# Patient Record
Sex: FEMALE | NOT HISPANIC OR LATINO | Employment: FULL TIME | URBAN - METROPOLITAN AREA
[De-identification: names, ages, dates, MRNs, and addresses within clinical notes are randomized per-mention and may not be internally consistent; named-entity substitution may affect disease eponyms.]

---

## 2017-10-24 ENCOUNTER — COMMUNICATION - HEALTHEAST (OUTPATIENT)
Dept: FAMILY MEDICINE | Facility: CLINIC | Age: 32
End: 2017-10-24

## 2019-07-18 ENCOUNTER — RECORDS - HEALTHEAST (OUTPATIENT)
Dept: ADMINISTRATIVE | Facility: OTHER | Age: 34
End: 2019-07-18

## 2019-07-18 ENCOUNTER — HOSPITAL ENCOUNTER (INPATIENT)
Facility: CLINIC | Age: 34
LOS: 5 days | Discharge: HOME | DRG: 137 | End: 2019-07-23
Attending: EMERGENCY MEDICINE | Admitting: INTERNAL MEDICINE
Payer: COMMERCIAL

## 2019-07-18 DIAGNOSIS — K04.7 DENTAL ABSCESS: ICD-10-CM

## 2019-07-18 PROCEDURE — 25000128 H RX IP 250 OP 636: Performed by: EMERGENCY MEDICINE

## 2019-07-18 PROCEDURE — 25000132 ZZH RX MED GY IP 250 OP 250 PS 637: Performed by: PHYSICIAN ASSISTANT

## 2019-07-18 PROCEDURE — 41800 DRAINAGE OF GUM LESION: CPT | Performed by: EMERGENCY MEDICINE

## 2019-07-18 PROCEDURE — 99156 MOD SED OTH PHYS/QHP 5/>YRS: CPT | Mod: Z6 | Performed by: EMERGENCY MEDICINE

## 2019-07-18 PROCEDURE — 25000128 H RX IP 250 OP 636: Performed by: HOSPITALIST

## 2019-07-18 PROCEDURE — 99157 MOD SED OTHER PHYS/QHP EA: CPT | Mod: Z6 | Performed by: EMERGENCY MEDICINE

## 2019-07-18 PROCEDURE — 99285 EMERGENCY DEPT VISIT HI MDM: CPT | Mod: 25 | Performed by: EMERGENCY MEDICINE

## 2019-07-18 PROCEDURE — 87040 BLOOD CULTURE FOR BACTERIA: CPT | Performed by: HOSPITALIST

## 2019-07-18 PROCEDURE — G0378 HOSPITAL OBSERVATION PER HR: HCPCS

## 2019-07-18 PROCEDURE — 99156 MOD SED OTH PHYS/QHP 5/>YRS: CPT | Performed by: EMERGENCY MEDICINE

## 2019-07-18 PROCEDURE — 96365 THER/PROPH/DIAG IV INF INIT: CPT | Performed by: EMERGENCY MEDICINE

## 2019-07-18 PROCEDURE — 96376 TX/PRO/DX INJ SAME DRUG ADON: CPT | Performed by: EMERGENCY MEDICINE

## 2019-07-18 PROCEDURE — 96375 TX/PRO/DX INJ NEW DRUG ADDON: CPT | Performed by: EMERGENCY MEDICINE

## 2019-07-18 PROCEDURE — 99157 MOD SED OTHER PHYS/QHP EA: CPT | Performed by: EMERGENCY MEDICINE

## 2019-07-18 PROCEDURE — 99220 ZZC INITIAL OBSERVATION CARE,LEVL III: CPT | Performed by: PHYSICIAN ASSISTANT

## 2019-07-18 PROCEDURE — 25800030 ZZH RX IP 258 OP 636: Performed by: PHYSICIAN ASSISTANT

## 2019-07-18 PROCEDURE — 25800030 ZZH RX IP 258 OP 636

## 2019-07-18 PROCEDURE — 0C940ZZ DRAINAGE OF BUCCAL MUCOSA, OPEN APPROACH: ICD-10-PCS | Performed by: EMERGENCY MEDICINE

## 2019-07-18 PROCEDURE — 12000001 ZZH R&B MED SURG/OB UMMC

## 2019-07-18 PROCEDURE — 25000128 H RX IP 250 OP 636

## 2019-07-18 PROCEDURE — 25000128 H RX IP 250 OP 636: Performed by: PHYSICIAN ASSISTANT

## 2019-07-18 PROCEDURE — 36415 COLL VENOUS BLD VENIPUNCTURE: CPT | Performed by: HOSPITALIST

## 2019-07-18 PROCEDURE — 25000132 ZZH RX MED GY IP 250 OP 250 PS 637: Performed by: HOSPITALIST

## 2019-07-18 RX ORDER — PROPOFOL 10 MG/ML
INJECTION, EMULSION INTRAVENOUS
Status: COMPLETED
Start: 2019-07-18 | End: 2019-07-18

## 2019-07-18 RX ORDER — LIDOCAINE 40 MG/G
CREAM TOPICAL
Status: DISCONTINUED | OUTPATIENT
Start: 2019-07-18 | End: 2019-07-23 | Stop reason: HOSPADM

## 2019-07-18 RX ORDER — LIDOCAINE 40 MG/G
CREAM TOPICAL
Status: DISCONTINUED | OUTPATIENT
Start: 2019-07-18 | End: 2019-07-18

## 2019-07-18 RX ORDER — ONDANSETRON 4 MG/1
4 TABLET, ORALLY DISINTEGRATING ORAL EVERY 6 HOURS PRN
Status: DISCONTINUED | OUTPATIENT
Start: 2019-07-18 | End: 2019-07-18

## 2019-07-18 RX ORDER — ACETAMINOPHEN 325 MG/1
975 TABLET ORAL EVERY 8 HOURS PRN
Status: DISCONTINUED | OUTPATIENT
Start: 2019-07-18 | End: 2019-07-18

## 2019-07-18 RX ORDER — AMPICILLIN AND SULBACTAM 2; 1 G/1; G/1
3 INJECTION, POWDER, FOR SOLUTION INTRAMUSCULAR; INTRAVENOUS EVERY 6 HOURS
Status: DISCONTINUED | OUTPATIENT
Start: 2019-07-18 | End: 2019-07-22

## 2019-07-18 RX ORDER — NALOXONE HYDROCHLORIDE 0.4 MG/ML
.1-.4 INJECTION, SOLUTION INTRAMUSCULAR; INTRAVENOUS; SUBCUTANEOUS
Status: DISCONTINUED | OUTPATIENT
Start: 2019-07-18 | End: 2019-07-18

## 2019-07-18 RX ORDER — OXYCODONE HYDROCHLORIDE 5 MG/1
5 TABLET ORAL EVERY 4 HOURS PRN
Status: DISCONTINUED | OUTPATIENT
Start: 2019-07-18 | End: 2019-07-19

## 2019-07-18 RX ORDER — AMPICILLIN AND SULBACTAM 2; 1 G/1; G/1
3 INJECTION, POWDER, FOR SOLUTION INTRAMUSCULAR; INTRAVENOUS ONCE
Status: COMPLETED | OUTPATIENT
Start: 2019-07-18 | End: 2019-07-18

## 2019-07-18 RX ORDER — AMPICILLIN AND SULBACTAM 2; 1 G/1; G/1
3 INJECTION, POWDER, FOR SOLUTION INTRAMUSCULAR; INTRAVENOUS EVERY 6 HOURS
Status: DISCONTINUED | OUTPATIENT
Start: 2019-07-18 | End: 2019-07-18

## 2019-07-18 RX ORDER — SODIUM CHLORIDE 9 MG/ML
INJECTION, SOLUTION INTRAVENOUS
Status: COMPLETED
Start: 2019-07-18 | End: 2019-07-18

## 2019-07-18 RX ORDER — ACETAMINOPHEN 325 MG/1
975 TABLET ORAL 3 TIMES DAILY
Status: DISCONTINUED | OUTPATIENT
Start: 2019-07-18 | End: 2019-07-23 | Stop reason: HOSPADM

## 2019-07-18 RX ORDER — ONDANSETRON 2 MG/ML
4 INJECTION INTRAMUSCULAR; INTRAVENOUS EVERY 6 HOURS PRN
Status: DISCONTINUED | OUTPATIENT
Start: 2019-07-18 | End: 2019-07-18

## 2019-07-18 RX ORDER — HYDROMORPHONE HYDROCHLORIDE 1 MG/ML
.3-.5 INJECTION, SOLUTION INTRAMUSCULAR; INTRAVENOUS; SUBCUTANEOUS
Status: DISCONTINUED | OUTPATIENT
Start: 2019-07-18 | End: 2019-07-18

## 2019-07-18 RX ORDER — POLYETHYLENE GLYCOL 3350 17 G/17G
17 POWDER, FOR SOLUTION ORAL DAILY PRN
Status: DISCONTINUED | OUTPATIENT
Start: 2019-07-18 | End: 2019-07-23 | Stop reason: HOSPADM

## 2019-07-18 RX ORDER — BISACODYL 10 MG
10 SUPPOSITORY, RECTAL RECTAL DAILY PRN
Status: DISCONTINUED | OUTPATIENT
Start: 2019-07-18 | End: 2019-07-23 | Stop reason: HOSPADM

## 2019-07-18 RX ORDER — KETOROLAC TROMETHAMINE 30 MG/ML
30 INJECTION, SOLUTION INTRAMUSCULAR; INTRAVENOUS ONCE
Status: COMPLETED | OUTPATIENT
Start: 2019-07-18 | End: 2019-07-18

## 2019-07-18 RX ORDER — ONDANSETRON 4 MG/1
4 TABLET, ORALLY DISINTEGRATING ORAL EVERY 6 HOURS PRN
Status: DISCONTINUED | OUTPATIENT
Start: 2019-07-18 | End: 2019-07-23 | Stop reason: HOSPADM

## 2019-07-18 RX ORDER — FENTANYL CITRATE 50 UG/ML
INJECTION, SOLUTION INTRAMUSCULAR; INTRAVENOUS
Status: COMPLETED
Start: 2019-07-18 | End: 2019-07-18

## 2019-07-18 RX ORDER — SACCHAROMYCES BOULARDII 250 MG
250 CAPSULE ORAL 2 TIMES DAILY
Status: DISCONTINUED | OUTPATIENT
Start: 2019-07-18 | End: 2019-07-23 | Stop reason: HOSPADM

## 2019-07-18 RX ORDER — HYDROMORPHONE HYDROCHLORIDE 1 MG/ML
0.5 INJECTION, SOLUTION INTRAMUSCULAR; INTRAVENOUS; SUBCUTANEOUS ONCE
Status: COMPLETED | OUTPATIENT
Start: 2019-07-18 | End: 2019-07-18

## 2019-07-18 RX ORDER — HYDROMORPHONE HCL/0.9% NACL/PF 0.2MG/0.2
0.2 SYRINGE (ML) INTRAVENOUS
Status: DISCONTINUED | OUTPATIENT
Start: 2019-07-18 | End: 2019-07-22

## 2019-07-18 RX ORDER — NALOXONE HYDROCHLORIDE 0.4 MG/ML
.1-.4 INJECTION, SOLUTION INTRAMUSCULAR; INTRAVENOUS; SUBCUTANEOUS
Status: DISCONTINUED | OUTPATIENT
Start: 2019-07-18 | End: 2019-07-23 | Stop reason: HOSPADM

## 2019-07-18 RX ORDER — AMOXICILLIN 250 MG
1 CAPSULE ORAL 2 TIMES DAILY
Status: DISCONTINUED | OUTPATIENT
Start: 2019-07-18 | End: 2019-07-23 | Stop reason: HOSPADM

## 2019-07-18 RX ORDER — OXYCODONE HYDROCHLORIDE 5 MG/1
5-10 TABLET ORAL
Status: DISCONTINUED | OUTPATIENT
Start: 2019-07-18 | End: 2019-07-18

## 2019-07-18 RX ORDER — SODIUM CHLORIDE 9 MG/ML
INJECTION, SOLUTION INTRAVENOUS CONTINUOUS
Status: DISCONTINUED | OUTPATIENT
Start: 2019-07-18 | End: 2019-07-20

## 2019-07-18 RX ORDER — LORAZEPAM 2 MG/ML
INJECTION INTRAMUSCULAR
Status: COMPLETED
Start: 2019-07-18 | End: 2019-07-18

## 2019-07-18 RX ORDER — AMOXICILLIN 250 MG
2 CAPSULE ORAL 2 TIMES DAILY
Status: DISCONTINUED | OUTPATIENT
Start: 2019-07-18 | End: 2019-07-23 | Stop reason: HOSPADM

## 2019-07-18 RX ORDER — IBUPROFEN 600 MG/1
600 TABLET, FILM COATED ORAL EVERY 6 HOURS
Status: DISCONTINUED | OUTPATIENT
Start: 2019-07-18 | End: 2019-07-18

## 2019-07-18 RX ORDER — HYDROMORPHONE HYDROCHLORIDE 1 MG/ML
0.5 INJECTION, SOLUTION INTRAMUSCULAR; INTRAVENOUS; SUBCUTANEOUS
Status: DISCONTINUED | OUTPATIENT
Start: 2019-07-18 | End: 2019-07-18

## 2019-07-18 RX ORDER — KETOROLAC TROMETHAMINE 30 MG/ML
30 INJECTION, SOLUTION INTRAMUSCULAR; INTRAVENOUS EVERY 6 HOURS PRN
Status: DISCONTINUED | OUTPATIENT
Start: 2019-07-18 | End: 2019-07-19

## 2019-07-18 RX ORDER — HYDROMORPHONE HCL/0.9% NACL/PF 0.2MG/0.2
0.2 SYRINGE (ML) INTRAVENOUS
Status: DISCONTINUED | OUTPATIENT
Start: 2019-07-18 | End: 2019-07-18

## 2019-07-18 RX ORDER — ACETAMINOPHEN 325 MG/1
650 TABLET ORAL EVERY 4 HOURS
Status: DISCONTINUED | OUTPATIENT
Start: 2019-07-18 | End: 2019-07-18

## 2019-07-18 RX ORDER — ONDANSETRON 2 MG/ML
4 INJECTION INTRAMUSCULAR; INTRAVENOUS EVERY 6 HOURS PRN
Status: DISCONTINUED | OUTPATIENT
Start: 2019-07-18 | End: 2019-07-23 | Stop reason: HOSPADM

## 2019-07-18 RX ORDER — CHLORHEXIDINE GLUCONATE ORAL RINSE 1.2 MG/ML
15 SOLUTION DENTAL 2 TIMES DAILY
Status: DISCONTINUED | OUTPATIENT
Start: 2019-07-18 | End: 2019-07-23 | Stop reason: HOSPADM

## 2019-07-18 RX ADMIN — HYDROMORPHONE HYDROCHLORIDE 0.5 MG: 1 INJECTION, SOLUTION INTRAMUSCULAR; INTRAVENOUS; SUBCUTANEOUS at 08:38

## 2019-07-18 RX ADMIN — CHLORHEXIDINE GLUCONATE 0.12% ORAL RINSE 15 ML: 1.2 LIQUID ORAL at 20:21

## 2019-07-18 RX ADMIN — Medication 0.2 MG: at 20:22

## 2019-07-18 RX ADMIN — LORAZEPAM 1 MG: 2 INJECTION INTRAMUSCULAR; INTRAVENOUS at 05:10

## 2019-07-18 RX ADMIN — HYDROMORPHONE HYDROCHLORIDE 0.5 MG: 1 INJECTION, SOLUTION INTRAMUSCULAR; INTRAVENOUS; SUBCUTANEOUS at 02:46

## 2019-07-18 RX ADMIN — AMPICILLIN SODIUM AND SULBACTAM SODIUM 3 G: 2; 1 INJECTION, POWDER, FOR SOLUTION INTRAMUSCULAR; INTRAVENOUS at 08:38

## 2019-07-18 RX ADMIN — PROPOFOL: 10 INJECTION, EMULSION INTRAVENOUS at 04:36

## 2019-07-18 RX ADMIN — OXYCODONE HYDROCHLORIDE 5 MG: 5 TABLET ORAL at 22:33

## 2019-07-18 RX ADMIN — Medication 250 MG: at 10:58

## 2019-07-18 RX ADMIN — ACETAMINOPHEN 975 MG: 325 TABLET, FILM COATED ORAL at 23:59

## 2019-07-18 RX ADMIN — KETOROLAC TROMETHAMINE 30 MG: 30 INJECTION, SOLUTION INTRAMUSCULAR at 05:25

## 2019-07-18 RX ADMIN — AMPICILLIN SODIUM AND SULBACTAM SODIUM 3 G: 2; 1 INJECTION, POWDER, FOR SOLUTION INTRAMUSCULAR; INTRAVENOUS at 22:57

## 2019-07-18 RX ADMIN — OXYCODONE HYDROCHLORIDE 5 MG: 5 TABLET ORAL at 10:58

## 2019-07-18 RX ADMIN — HYDROMORPHONE HYDROCHLORIDE 0.5 MG: 1 INJECTION, SOLUTION INTRAMUSCULAR; INTRAVENOUS; SUBCUTANEOUS at 03:28

## 2019-07-18 RX ADMIN — KETOROLAC TROMETHAMINE 30 MG: 30 INJECTION, SOLUTION INTRAMUSCULAR at 10:05

## 2019-07-18 RX ADMIN — Medication 0.2 MG: at 23:59

## 2019-07-18 RX ADMIN — HYDROMORPHONE HYDROCHLORIDE 1 MG: 1 INJECTION, SOLUTION INTRAMUSCULAR; INTRAVENOUS; SUBCUTANEOUS at 05:22

## 2019-07-18 RX ADMIN — FENTANYL CITRATE 100 MCG: 50 INJECTION INTRAMUSCULAR; INTRAVENOUS at 05:04

## 2019-07-18 RX ADMIN — Medication 250 MG: at 20:40

## 2019-07-18 RX ADMIN — SODIUM CHLORIDE 1000 ML: 9 INJECTION, SOLUTION INTRAVENOUS at 04:35

## 2019-07-18 RX ADMIN — ACETAMINOPHEN 975 MG: 325 TABLET, FILM COATED ORAL at 15:59

## 2019-07-18 RX ADMIN — KETOROLAC TROMETHAMINE 30 MG: 30 INJECTION, SOLUTION INTRAMUSCULAR at 15:59

## 2019-07-18 RX ADMIN — ACETAMINOPHEN 650 MG: 325 TABLET, FILM COATED ORAL at 10:05

## 2019-07-18 RX ADMIN — AMPICILLIN SODIUM AND SULBACTAM SODIUM 3 G: 2; 1 INJECTION, POWDER, FOR SOLUTION INTRAMUSCULAR; INTRAVENOUS at 15:59

## 2019-07-18 RX ADMIN — Medication 0.2 MG: at 17:14

## 2019-07-18 RX ADMIN — OXYCODONE HYDROCHLORIDE 5 MG: 5 TABLET ORAL at 18:43

## 2019-07-18 RX ADMIN — OXYCODONE HYDROCHLORIDE 5 MG: 5 TABLET ORAL at 14:53

## 2019-07-18 RX ADMIN — HYDROMORPHONE HYDROCHLORIDE 0.5 MG: 1 INJECTION, SOLUTION INTRAMUSCULAR; INTRAVENOUS; SUBCUTANEOUS at 10:11

## 2019-07-18 RX ADMIN — KETOROLAC TROMETHAMINE 30 MG: 30 INJECTION, SOLUTION INTRAMUSCULAR at 22:52

## 2019-07-18 RX ADMIN — SODIUM CHLORIDE: 9 INJECTION, SOLUTION INTRAVENOUS at 10:12

## 2019-07-18 RX ADMIN — SENNOSIDES AND DOCUSATE SODIUM 2 TABLET: 8.6; 5 TABLET ORAL at 20:22

## 2019-07-18 ASSESSMENT — ENCOUNTER SYMPTOMS
FEVER: 1
ABDOMINAL PAIN: 0
SHORTNESS OF BREATH: 0

## 2019-07-18 ASSESSMENT — MIFFLIN-ST. JEOR: SCORE: 1489.21

## 2019-07-18 ASSESSMENT — PAIN DESCRIPTION - DESCRIPTORS: DESCRIPTORS: ACHING;DISCOMFORT

## 2019-07-18 NOTE — PLAN OF CARE
Focus: Admit from ED  D: Arrived alert and orientated times four. Complaining of lots of pain. Penrose drain intact inside of left cheek. P: Continue to monitor.

## 2019-07-18 NOTE — PLAN OF CARE
Observation goals:  1.Pain control:  Pt able to sleep a little at beginning of shift but woke up with increase mouth pain from tooth abscess.  Drain in place. Cheek swollen. Medicated with scheduled tylenol,Toradol with some relief but pain still intolerable. Medicated with IV dilauidid with some relief. Oxycodone given when due. Ice pack to cheek.  2. Tolerating orals:  Patient tolerating pudding and a few soft foods but mouth/tooth abscess became painful. Medicated with oxycodone. Ice pack to cheek.  3: Ambulation:  Pt able to ambulate to bathroom with stand by assist of 1. Gait slow but steady. Voiding without problems. Pt states she can not walk further because mouth/jaw painful and she needs to rest.   4: Vital signs:Temp 99.9 Tylenol po given.Call light within reach.

## 2019-07-18 NOTE — PROGRESS NOTES
Dental Service Progress Note    Interval History: Patient was seen in the ED this morning for a vestibular incision and drainage of the left mandible. Visited the patient at 9:30 am to evaluate how she was feeling (4.5 hours post-op).       Exam:     General: Patient is up, moving, and speaking on the phone when I visited. Patient states that she is feeling a lot better since the procedure and that she apologizes for how rude she was last night. Patient's speaking is much clearer than pre-op.     Extraoral exam: Patient's swelling as increased slightly since the procedure but the area is significantly less tender to palpation and less indurated.      Intraoral exam: Patient still has limited opening but she states it is less painful. Negative for raised tongue or FOM swelling. Drain is in place, patient claims she is being careful not to disturb it.        Assessment: Patient's general habitus indicates she is recovering well from the procedure but continued monitoring is prudent.      Plan: Patient stated she is staying as an inpatient until at least tomorrow for monitoring and antibiotic therapy. Patient will be evaluated again tomorrow. Patient was provided with follow up information for the [facility] dental clinic and was informed she should not delay coming in for an appointment to remove the source of her infection.         Timoteo Dotson DDS   PGY-1  Pager: [phone]

## 2019-07-18 NOTE — CONSULTS
Dental Service Consultation        Zahida Garcia MRN# [id number]   YOB: 1985 Age: 34 year old   Date of Admission: 7/18/2019     Reason for consult: I was asked by Dr. Marcella Schultz to evaluate this patient for left mandibular swelling.           Assessment and Plan:   Assessment: Patient presents with indurated swelling in the area of the left mandible that is tender to palpation and extends anterior to the area of the left parasymphasis and posterior to the body of the mandible. Unable to palpate the left inferior border of the mandible Patient is seated in her bed and appears anxious and very uncomfortable. Patient denies difficulty breathing but reports difficulty/pain while swallowing and states there are changes in her voice (sounds deeper than normal). Patient has a limited two finger mouth opening. CT imaging provided by [facility] shows possible pterygomandibular, masseteric, and buccal space infection.      Plan: Consultation with OMFS and incision and drainage of abscess under light conscious sedation with local anesthetic.              Chief Complaint:   Patient states that her mouth has swollen rapidly since yesterday. She is taking pain medication and antibiotics for the swelling but that nothing helps at this time. Patient states she is in misery and wants to do anything to help alleviate her discomfort.    History is obtained from the patient         History of Present Illness:   This patient is a 34 year old female who presents to who presents from [facility] ED for evaluation of a left mandibular odontogenic abscess. Patient states a root canal had been completed in that area about a month ago. Patient noticed her mandible was tender on Saturday and treated the area conservatively with antibiotics Patient went to an oral surgeon on Monday and had the abscess drained. Patient claims that yesterday the lesion started to swell much more rapidly and she started to feel  significantly worse. Patient was ultimately sent to the ER where a CT was performed. Patient was then transferred to [facility] ED.              Past Medical History:   History reviewed. No pertinent past medical history.          Past Surgical History:     Past Surgical History:   Procedure Laterality Date     APPENDECTOMY       CHOLECYSTECTOMY                 Social History:     Social History     Tobacco Use     Smoking status: Current Every Day Smoker     Packs/day: 0.25     Smokeless tobacco: Never Used   Substance Use Topics     Alcohol use: Yes     Comment: rarely             Family History:   History reviewed. No pertinent family history.          Immunizations:     There is no immunization history on file for this patient.          Allergies:     Allergies   Allergen Reactions     Tramadol Other (See Comments)     Seizures per patient             Medications:     No current Epic-ordered facility-administered medications on file.      Current Outpatient Medications Ordered in Epic   Medication     HYDROcodone-acetaminophen (NORCO) 5-325 MG per tablet     ondansetron (ZOFRAN ODT) 4 MG disintegrating tablet             Review of Systems:   The 4 point Review of Systems is negative other than noted in the HPI            Physical Exam:   Vitals were reviewed  Temp: 98.4  F (36.9  C) Temp src: Oral BP: 114/81 Pulse: 67   Resp: 16 SpO2: 100 % O2 Device: None (Room air)      Head and neck exam:  Patient presents with indurated swelling in the area of the left mandible that is tender to palpation and extends anterior to the area of the left parasymphasis and posterior to the body of the mandible. Unable to palpate the left inferior border of the mandible. Patient recoils when palpating the area in pain. Area of swelling marked with surgical site marker.    Intraoral exam: Limited due to patients limited opening. Patient's tongue is not raised from the FOM.        Data:   Radiographic interpretation: CT image  taken on 7/18/19  Patient has fluid infiltration of the pterygomandibular, masseteric, and buccal spaces.      The patient was discussed with: Dr. Schultz and Dr. Howard Dotson DDS  PGY1  Pager: [phone]

## 2019-07-18 NOTE — H&P
[facility]    History and Physical - Hospitalist Service       Date of Admission:  7/18/2019    Assessment & Plan   Zahida Garcia is a 34 year old female admitted on 7/18/2019 for dental abscess.     #Dental abscess: In setting of recent root canal, worsening pain and swelling x4 days, failed OP antibiotics. Presented to OSH ED, CT w/ moderate soft tissue swelling and inflammatory changes involving the apical aspect of the left hemimandible w/ periapical lucency involving tooth #19, immediately adjacent to that tooth, there is 2.5 x 13 mm fluid collection along the buccal soft tissues. S/P stab incision and debridement w/ purulent drainage, drain placed by OMFS. WBC count & LA normal 07/17 at OSH. VS w/ normotension, normal HR, afebrile.   -Dental and OMFS following  -IV antibiotics w/ Unasyn Q6 while on obs, can transition to oral Augmentin on discharge (875 mg BID x7-14 days)  -NS at 125 ml/hour while on obs  -Pain management w/ scheduled tylenol, PRN toradol, PRN oxycodone, PRN dilaudid for severe pain  -Bowel regimen ordered  -Anti emetics available  -Oral cares per Dental, per OMFS, needs to follow up with dental clinic in 2-3 days for consideration of pulling drain and continued f/u     Diet: ADAT to mechanical soft  DVT Prophylaxis: Pneumatic Compression Devices and Ambulate every shift  Adamsno Catheter: not present  Code Status: FULL    Disposition Plan   Expected discharge: Tomorrow, recommended to prior living arrangement once pain well controlled, plan for antibiotics.  Entered: Fadia Frost PA-C 07/18/2019, 8:10 AM     The patient's care was discussed with the Attending Physician, Dr. Ballesteros, Bedside Nurse, OMFS, dental and Patient.    Fadia Frost PA-C  [facility]    ______________________________________________________________________    Chief Complaint   Dental pain    History is obtained from the  patient    History of Present Illness   Zahida Garcia is a 34 year old female admitted on 7/18/2019 for dental abscess.     The patient notes she had a root canal 1 month ago. Had 3 weeks of relief. Noted some swelling and pain x1 week, has been getting progressively worse in the last 4 days. Went to oral surgeon who noted an abscess and sent her to a different oral surgeon- their office didn't accept her insurance and she wasn't able to have the abscess drained. She notes she went to multiple urgent cares and ERs for antibiotics and pain medications which did not seem to help. Finally when the oral surgeon she was referred to couldn't drain her abscess she went ot [facility] ED and ended up here.    She notes that she has been having fevers and chills at home. Difficulty opening mouth and pain with chewing or talking. She notes swelling and pain worse in the last 4 days. She denies any IVDU or drug use. She notes she would like to be pain free. She notes no real relief after abscess drainage. She denies any chronic medical concerns or issues. She drinks socially, denies daily alcohol use.     She denies dysuria, abdominal pain, nausea/vomiting, dyspnea, cough, chest pain, difficulty with secretions or rashes. She notes some diarrhea since starting antibiotics.     Review of Systems    The 10 point Review of Systems is negative other than noted in the HPI or here.     Past Medical History    I have reviewed this patient's medical history and updated it with pertinent information if needed.   History reviewed. No pertinent past medical history.    Past Surgical History   I have reviewed this patient's surgical history and updated it with pertinent information if needed.  Past Surgical History:   Procedure Laterality Date     APPENDECTOMY       CHOLECYSTECTOMY         Social History   I have reviewed this patient's social history and updated it with pertinent information if needed.  Social History     Tobacco  Use     Smoking status: Current Every Day Smoker     Packs/day: 0.25     Smokeless tobacco: Never Used   Substance Use Topics     Alcohol use: Yes     Comment: rarely     Drug use: No   Lives in home with  and 2 year old in [facility]. Has daughter that is 20 and studying in [place].     Family History   I have reviewed this patient's family history and updated it with pertinent information if needed.     Prior to Admission Medications   Prior to Admission Medications   Prescriptions Last Dose Informant Patient Reported? Taking?   HYDROcodone-acetaminophen (NORCO) 5-325 MG per tablet   No No   Sig: Take 1-2 tablets by mouth every 4 hours as needed for moderate to severe pain   ondansetron (ZOFRAN ODT) 4 MG disintegrating tablet   No No   Sig: Take 1 tablet (4 mg) by mouth every 8 hours as needed for nausea      Facility-Administered Medications: None     Allergies   Allergies   Allergen Reactions     Tramadol Other (See Comments)     Seizures per patient       Physical Exam   Vital Signs: Temp: 98.4  F (36.9  C) Temp src: Oral BP: 125/72 Pulse: 99 Heart Rate: 62 Resp: 11 SpO2: 100 % O2 Device: None (Room air)    Weight: 183 lbs 0 oz  GENERAL: Alert and oriented x 3. Lying comfortably in bed.   HEENT: Anicteric sclera. Mucous membranes moist and without lesions. Left lower mouth with gauze packing in place, patient only able to open mouth ~1.5 cm, edema of left lower jaw.   CV: RRR. S1, S2. No murmurs appreciated.   RESPIRATORY: Effort normal on RA. Lungs CTAB with no wheezing, rales, rhonchi.   GI: Abdomen soft and non distended, bowel sounds present. No tenderness, rebound, guarding.   MUSCULOSKELETAL: No joint swelling or tenderness. Moves all extremities.   NEUROLOGICAL: No focal deficits.   EXTREMITIES: No peripheral edema.   SKIN: No jaundice. No rashes.       Data   Data reviewed today: I reviewed all medications, new labs and imaging results over the last 24 hours. I personally reviewed OSH  imaging read, see A&P.     Reviewed OSH BMP and CBC from 07/17

## 2019-07-18 NOTE — ED PROVIDER NOTES
History     Chief Complaint   Patient presents with     Dental Pain     HPI  Zahida Garcia is a 34 year old female who presents from [facility] emergency department for dental/oral surgery evaluation and likely admission.  She reports that she had a root canal in the left lower molar about a month ago.  Things have been going fine for about 3 weeks.  She states on Saturday her  noted that she had a little bit of swelling in her left lower jaw.  She states it was not painful but it was a little tender with palpation, so she went to a local urgent care.  They gave her antibiotics.  She states on Monday it started to hurt more, she went to an oral surgeon and they were able to drain an abscess, gave different antibiotics.  She states on Wednesday it hurt significantly more, became much more swollen.  She went back to her oral surgery, was referred to a second oral surgeon but, and ultimately sent to the ER.  CT was done there which showed:    CONCLUSION:   1.  Moderate soft tissue swelling and inflammatory changes involving the apical aspect of the left hemimandible. There is periapical lucency involving tooth #19, suspicious for odontogenic disease. Immediately adjacent to that tooth, there is 2.5 x 13 mm   fluid collection along the buccal soft tissues.   2.  Heterogeneous hyperdensity within the left hemimandible, nonspecific. It could represent involvement of the infection of the left hemimandible.  3.  Couple of reactive level 1B nodes.    Unfortunately they were unable to have an oral surgeon see her at [facility], nor at [facility], and she is sent here for further care/management.  In reviewing the notes it does appear she was given a dose of Zosyn prior to transfer.  The patient additionally does report that she has had fevers for the last couple of days, maximum of 102.  She denies any other specific complaints.    History reviewed. No pertinent past medical history.    Past Surgical History:  "  Procedure Laterality Date     APPENDECTOMY       CHOLECYSTECTOMY         History reviewed. No pertinent family history.    Social History     Tobacco Use     Smoking status: Current Every Day Smoker     Packs/day: 0.25     Smokeless tobacco: Never Used   Substance Use Topics     Alcohol use: Yes     Comment: rarely         I have reviewed the Medications, Allergies, Past Medical and Surgical History, and Social History in the Epic system.    Review of Systems   Constitutional: Positive for fever.   HENT: Positive for dental problem.         Facial swelling   Respiratory: Negative for shortness of breath.    Cardiovascular: Negative for chest pain.   Gastrointestinal: Negative for abdominal pain.   All other systems reviewed and are negative.      Physical Exam   BP: 114/81  Pulse: 67  Heart Rate: 78  Temp: 98.4  F (36.9  C)  Resp: 16  Height: 160 cm (5' 3\")  Weight: 83 kg (183 lb)  SpO2: 100 %      Physical Exam   Constitutional: No distress.   HENT:   Head: Atraumatic.       Mouth/Throat: Oropharynx is clear and moist. No oropharyngeal exudate.   Eyes: Pupils are equal, round, and reactive to light. No scleral icterus.   Cardiovascular: Normal heart sounds and intact distal pulses.   Pulmonary/Chest: Breath sounds normal. No respiratory distress.   Abdominal: Soft. There is no tenderness.   Musculoskeletal: She exhibits no edema or tenderness.   Skin: Skin is warm. No rash noted. She is not diaphoretic.       ED Course        Procedures             Critical Care time:  none   [facility] Procedure Note        Sedation:      Performed by: Marcella Schultz  Authorized by: Marcella Schultz    Pre-Procedure Assessment done at 0230.    Expected Level:  Moderate Sedation    Indication:  Sedation is required to allow for Incision and drainage of abcess    Consent obtained from patient after discussing the risks, benefits and alternatives.    PO Intake:  Appropriately NPO for procedure    ASA Class:  Class 1 " - HEALTHY PATIENT    Mallampati:  Grade 3:  Soft palate visible, posterior pharyngeal wall not visible    Lungs: Lungs Clear with good breath sounds bilaterally.     Heart: Normal heart sounds and rate    History and physical reviewed and no updates needed. I have reviewed the lab findings, diagnostic data, medications, and the plan for sedation. I have determined this patient to be an appropriate candidate for the planned sedation and procedure and have reassessed the patient IMMEDIATELY PRIOR to sedation and procedure.      Sedation Post Procedure Summary:    Prior to the start of the procedure and with procedural staff participation, I verbally confirmed the patient s identity using two indicators, relevant allergies, that the procedure was appropriate and matched the consent or emergent situation, and that the correct equipment/implants were available. Immediately prior to starting the procedure I conducted the Time Out with the procedural staff and re-confirmed the patient s name, procedure, and site/side. (The Joint Commission universal protocol was followed.)  Yes      Sedatives: Propofol    Vital signs, airway, End Tidal CO2 and pulse oximetry were monitored and remained stable throughout the procedure and sedation was maintained until the procedure was complete.  The patient was monitored by staff until sedation discharge criteria were met.    Patient tolerance: Patient tolerated the procedure well with no immediate complications.    Time of sedation in minutes:  30 minutes from beginning to end of physician one to one monitoring.            Labs Ordered and Resulted from Time of ED Arrival Up to the Time of Departure from the ED - No data to display         Assessments & Plan (with Medical Decision Making)   Dental and oral surgery were called, did come to see the patient.  They did review the images on CT.  The patient was refusing to have the abscess drained without sedation.  Informed consent was  signed, patient is n.p.o. for many hours.  She was given propofol, required an extremely high dose (520 mg total).  The abscess was drained, Penrose drain was sewn in place.  No episodes of desaturation or other acute complications during the procedure.  After the patient came out of sedation she began screaming, stating that she was having severe pain, despite dental blocks performed during the procedure.  She was given fentanyl followed by Dilaudid, Toradol, Ativan.  She continued to screen for quite some time and eventually did fall asleep.  Neurosurgery recommended Unasyn.  This is been ordered.  She will be admitted to medicine for pain control and IV antibiotics at this time.    I have reviewed the nursing notes.    I have reviewed the findings, diagnosis, plan and need for follow up with the patient.       Medication List      There are no discharge medications for this visit.         Final diagnoses:   Dental abscess       7/18/2019   [facility], [place], EMERGENCY DEPARTMENT     Marcella Schultz MD  07/18/19 0821       Marcella Schultz MD  07/18/19 0822

## 2019-07-18 NOTE — ED NOTES
[facility], [place]   ED Nurse to Floor Handoff     Zahida Garcia is a 34 year old female who speaks English and lives with family members,  in a home  They arrived in the ED by ambulance from emergency room    ED Chief Complaint: Dental Pain    ED Dx;   Final diagnoses:   Dental abscess         Needed?: No    Allergies:   Allergies   Allergen Reactions     Tramadol Other (See Comments)     Seizures per patient   .  Past Medical Hx: History reviewed. No pertinent past medical history.   Baseline Mental status: WDL  Current Mental Status changes: at basesline    Infection present or suspected this encounter: cultures pending  Sepsis suspected: No  Isolation type: No active isolations     Activity level - Baseline/Home:  Independent  Activity Level - Current:   Independent    Bariatric equipment needed?: No    In the ED these meds were given:   Medications   HYDROmorphone (PF) (DILAUDID) injection 0.5 mg (0.5 mg Intravenous Given 7/18/19 0246)   HYDROmorphone (PF) (DILAUDID) injection 0.5 mg (0.5 mg Intravenous Given 7/18/19 0328)   sodium chloride 0.9 % infusion (1,000 mLs  New Bag 7/18/19 0435)   propofol (DIPRIVAN) 1000 MG/100ML infusion (  New Bag 7/18/19 0436)   fentaNYL (PF) (SUBLIMAZE) 100 MCG/2ML injection (100 mcg  Given 7/18/19 0504)   LORazepam (ATIVAN) 2 MG/ML injection (1 mg  Given 7/18/19 0510)   ketorolac (TORADOL) injection 30 mg (30 mg Intravenous Given 7/18/19 0525)   HYDROmorphone (DILAUDID) injection 1 mg (1 mg Intravenous Given 7/18/19 0522)       Drips running?  No    Home pump  No    Current LDAs  Peripheral IV 07/18/19 Right Upper arm (Active)   Site Assessment WDL 7/18/2019  2:06 AM   Line Status Infusing 7/18/2019  2:06 AM   Phlebitis Scale 0-->no symptoms 7/18/2019  2:06 AM   Infiltration Scale 0 7/18/2019  2:06 AM   Infiltration Site Treatment Method  None 7/18/2019  2:06 AM   Extravasation? No 7/18/2019  2:06 AM   Number of days: 0  "      Labs results: Labs Ordered and Resulted from Time of ED Arrival Up to the Time of Departure from the ED - No data to display    Imaging Studies: No results found for this or any previous visit (from the past 24 hour(s)).    Recent vital signs:   /84   Pulse 99   Temp 98.4  F (36.9  C) (Oral)   Resp 20   Ht 1.6 m (5' 3\")   Wt 83 kg (183 lb)   SpO2 97%   BMI 32.42 kg/m              Cardiac Rhythm: Normal Sinus  Pt needs tele? No  Skin/wound Issues: None    Code Status: Full Code    Pain control: poor    Nausea control: good    Abnormal labs/tests/findings requiring intervention:     Family present during ED course? No   Family Comments/Social Situation comments:     Tasks needing completion: None    Theresa Ward RN  [facility] -- [phone] [place] [facility]  [phone] [facility]    " [employer] Repair Preamble Text (Leave Blank If You Do Not Want): Extensive wide undermining was performed.

## 2019-07-18 NOTE — ED NOTES
[facility], [place]   ED Nurse to Floor Handoff     Zahida Garcia is a 34 year old female who speaks English and lives with a spouse,  in a home  They arrived in the ED by ambulance [facility]      ED Chief Complaint: Dental Pain    ED Dx;   Final diagnoses:   Dental abscess         Needed?: No    Allergies:   Allergies   Allergen Reactions     Tramadol Other (See Comments)     Seizures per patient   .  Past Medical Hx: History reviewed. No pertinent past medical history.   Baseline Mental status: WDL  Current Mental Status changes: at basesline    Infection present or suspected this encounter: no  Sepsis suspected: No  Isolation type: No active isolations     Activity level - Baseline/Home:  Independent  Activity Level - Current:   Independent    Bariatric equipment needed?: No    In the ED these meds were given:   Medications   ampicillin-sulbactam (UNASYN) 3 g vial to attach to  mL bag (3 g Intravenous New Bag 7/18/19 0838)   HYDROmorphone (PF) (DILAUDID) injection 0.5 mg (0.5 mg Intravenous Given 7/18/19 0838)   ampicillin-sulbactam (UNASYN) 3 g vial to attach to  mL bag (has no administration in time range)   HYDROmorphone (PF) (DILAUDID) injection 0.5 mg (0.5 mg Intravenous Given 7/18/19 0246)   HYDROmorphone (PF) (DILAUDID) injection 0.5 mg (0.5 mg Intravenous Given 7/18/19 0328)   sodium chloride 0.9 % infusion (1,000 mLs  New Bag 7/18/19 0435)   propofol (DIPRIVAN) 1000 MG/100ML infusion (  New Bag 7/18/19 0436)   fentaNYL (PF) (SUBLIMAZE) 100 MCG/2ML injection (100 mcg  Given 7/18/19 0504)   LORazepam (ATIVAN) 2 MG/ML injection (1 mg  Given 7/18/19 0510)   ketorolac (TORADOL) injection 30 mg (30 mg Intravenous Given 7/18/19 0525)   HYDROmorphone (DILAUDID) injection 1 mg (1 mg Intravenous Given 7/18/19 0522)       Drips running?  No    Home pump  No    Current LDAs  Peripheral IV 07/18/19 Right Upper arm (Active)   Site Assessment  "WDL 7/18/2019  2:06 AM   Line Status Infusing 7/18/2019  2:06 AM   Phlebitis Scale 0-->no symptoms 7/18/2019  2:06 AM   Infiltration Scale 0 7/18/2019  2:06 AM   Infiltration Site Treatment Method  None 7/18/2019  2:06 AM   Extravasation? No 7/18/2019  2:06 AM   Number of days: 0       Labs results: Labs Ordered and Resulted from Time of ED Arrival Up to the Time of Departure from the ED - No data to display    Imaging Studies: No results found for this or any previous visit (from the past 24 hour(s)).    Recent vital signs:   /72   Pulse 99   Temp 98.4  F (36.9  C) (Oral)   Resp 11   Ht 1.6 m (5' 3\")   Wt 83 kg (183 lb)   SpO2 100%   BMI 32.42 kg/m              Cardiac Rhythm: Normal Sinus  Pt needs tele? No  Skin/wound Issues: None    Code Status:Full code    Pain control: Fair Control    Nausea control: pt had none    Abnormal labs/tests/findings requiring intervention:     Family present during ED course? No   Family Comments/Social Situation comments:     Tasks needing completion: None    Keri Messina, RN  asc --   [phone] [facility]  [phone] [facility] ED    "

## 2019-07-18 NOTE — PROCEDURES
Incision and drainage  Date/Time: 7/18/2019 5:34 AM  Performed by: Howard Hutchinson MD  Authorized by: Howard Hutchinson MD     Consent:     Consent obtained:  Written and verbal    Consent given by:  Patient    Risks discussed:  Infection    Alternatives discussed:  No treatment and delayed treatment  Location:     Type:  Abscess    Size:  2cm    Location:  Mouth  Sedation:     Sedation type:  Moderate (conscious) sedation and deep  Procedure type:     Complexity:  Simple  Procedure details:     Needle aspiration: no      Incision types:  Stab incision    Incision depth:  Submucosal    Scalpel blade:  15    Wound management:  Debrided    Drainage:  Purulent    Drainage amount:  Moderate    Wound treatment:  Drain placed  Post-procedure details:     Patient tolerance of procedure:  Tolerated well, no immediate complications      Patient will F/U with [facility] clinic for removal of drain in 2-3 days. Ok to discharge from OMS perspective Augmentin for 7-10 days    Howard Hutchinson DDS  OMFS resident

## 2019-07-19 ENCOUNTER — APPOINTMENT (OUTPATIENT)
Dept: CT IMAGING | Facility: CLINIC | Age: 34
DRG: 137 | End: 2019-07-19
Attending: PHYSICIAN ASSISTANT
Payer: COMMERCIAL

## 2019-07-19 PROBLEM — L08.9 FACIAL INFECTION: Status: ACTIVE | Noted: 2019-07-19

## 2019-07-19 LAB
ANION GAP SERPL CALCULATED.3IONS-SCNC: 8 MMOL/L (ref 3–14)
BUN SERPL-MCNC: 6 MG/DL (ref 7–30)
CALCIUM SERPL-MCNC: 7.8 MG/DL (ref 8.5–10.1)
CHLORIDE SERPL-SCNC: 112 MMOL/L (ref 94–109)
CO2 SERPL-SCNC: 23 MMOL/L (ref 20–32)
CREAT SERPL-MCNC: 0.51 MG/DL (ref 0.52–1.04)
ERYTHROCYTE [DISTWIDTH] IN BLOOD BY AUTOMATED COUNT: 13.4 % (ref 10–15)
GFR SERPL CREATININE-BSD FRML MDRD: >90 ML/MIN/{1.73_M2}
GLUCOSE BLDC GLUCOMTR-MCNC: 85 MG/DL (ref 70–99)
GLUCOSE SERPL-MCNC: 67 MG/DL (ref 70–99)
HCT VFR BLD AUTO: 31.3 % (ref 35–47)
HGB BLD-MCNC: 10.4 G/DL (ref 11.7–15.7)
MCH RBC QN AUTO: 28.6 PG (ref 26.5–33)
MCHC RBC AUTO-ENTMCNC: 33.2 G/DL (ref 31.5–36.5)
MCV RBC AUTO: 86 FL (ref 78–100)
MRSA DNA SPEC QL NAA+PROBE: NEGATIVE
PLATELET # BLD AUTO: 315 10E9/L (ref 150–450)
POTASSIUM SERPL-SCNC: 3.6 MMOL/L (ref 3.4–5.3)
RBC # BLD AUTO: 3.64 10E12/L (ref 3.8–5.2)
SODIUM SERPL-SCNC: 143 MMOL/L (ref 133–144)
SPECIMEN SOURCE: NORMAL
WBC # BLD AUTO: 8.6 10E9/L (ref 4–11)

## 2019-07-19 PROCEDURE — 25000132 ZZH RX MED GY IP 250 OP 250 PS 637: Performed by: INTERNAL MEDICINE

## 2019-07-19 PROCEDURE — 99233 SBSQ HOSP IP/OBS HIGH 50: CPT | Performed by: PHYSICIAN ASSISTANT

## 2019-07-19 PROCEDURE — 12000001 ZZH R&B MED SURG/OB UMMC

## 2019-07-19 PROCEDURE — 25000132 ZZH RX MED GY IP 250 OP 250 PS 637: Performed by: PHYSICIAN ASSISTANT

## 2019-07-19 PROCEDURE — 87640 STAPH A DNA AMP PROBE: CPT | Performed by: PHYSICIAN ASSISTANT

## 2019-07-19 PROCEDURE — 00000146 ZZHCL STATISTIC GLUCOSE BY METER IP

## 2019-07-19 PROCEDURE — 25800030 ZZH RX IP 258 OP 636: Performed by: PHYSICIAN ASSISTANT

## 2019-07-19 PROCEDURE — 25800030 ZZH RX IP 258 OP 636: Performed by: INTERNAL MEDICINE

## 2019-07-19 PROCEDURE — 87641 MR-STAPH DNA AMP PROBE: CPT | Performed by: PHYSICIAN ASSISTANT

## 2019-07-19 PROCEDURE — 25000125 ZZHC RX 250: Performed by: INTERNAL MEDICINE

## 2019-07-19 PROCEDURE — G0378 HOSPITAL OBSERVATION PER HR: HCPCS

## 2019-07-19 PROCEDURE — 25000128 H RX IP 250 OP 636: Performed by: HOSPITALIST

## 2019-07-19 PROCEDURE — 36415 COLL VENOUS BLD VENIPUNCTURE: CPT | Performed by: PHYSICIAN ASSISTANT

## 2019-07-19 PROCEDURE — 85027 COMPLETE CBC AUTOMATED: CPT | Performed by: PHYSICIAN ASSISTANT

## 2019-07-19 PROCEDURE — 25000128 H RX IP 250 OP 636: Performed by: INTERNAL MEDICINE

## 2019-07-19 PROCEDURE — 80048 BASIC METABOLIC PNL TOTAL CA: CPT | Performed by: PHYSICIAN ASSISTANT

## 2019-07-19 PROCEDURE — 70491 CT SOFT TISSUE NECK W/DYE: CPT

## 2019-07-19 PROCEDURE — 25000128 H RX IP 250 OP 636: Performed by: PHYSICIAN ASSISTANT

## 2019-07-19 RX ORDER — NICOTINE POLACRILEX 4 MG
15-30 LOZENGE BUCCAL
Status: DISCONTINUED | OUTPATIENT
Start: 2019-07-19 | End: 2019-07-23 | Stop reason: HOSPADM

## 2019-07-19 RX ORDER — DEXTROSE MONOHYDRATE 25 G/50ML
25-50 INJECTION, SOLUTION INTRAVENOUS
Status: DISCONTINUED | OUTPATIENT
Start: 2019-07-19 | End: 2019-07-23 | Stop reason: HOSPADM

## 2019-07-19 RX ORDER — CEFAZOLIN SODIUM 1 G/50ML
1250 SOLUTION INTRAVENOUS EVERY 12 HOURS
Status: DISCONTINUED | OUTPATIENT
Start: 2019-07-19 | End: 2019-07-19

## 2019-07-19 RX ORDER — IBUPROFEN 200 MG
600-800 TABLET ORAL EVERY 6 HOURS PRN
Status: DISCONTINUED | OUTPATIENT
Start: 2019-07-19 | End: 2019-07-19

## 2019-07-19 RX ORDER — KETOROLAC TROMETHAMINE 30 MG/ML
30 INJECTION, SOLUTION INTRAMUSCULAR; INTRAVENOUS EVERY 6 HOURS PRN
Status: DISCONTINUED | OUTPATIENT
Start: 2019-07-19 | End: 2019-07-20

## 2019-07-19 RX ORDER — OXYCODONE HYDROCHLORIDE 5 MG/1
5-10 TABLET ORAL EVERY 4 HOURS PRN
Status: DISCONTINUED | OUTPATIENT
Start: 2019-07-19 | End: 2019-07-20

## 2019-07-19 RX ORDER — IOPAMIDOL 755 MG/ML
100 INJECTION, SOLUTION INTRAVASCULAR ONCE
Status: COMPLETED | OUTPATIENT
Start: 2019-07-19 | End: 2019-07-19

## 2019-07-19 RX ADMIN — OXYCODONE HYDROCHLORIDE 5 MG: 5 TABLET ORAL at 07:20

## 2019-07-19 RX ADMIN — SENNOSIDES AND DOCUSATE SODIUM 2 TABLET: 8.6; 5 TABLET ORAL at 08:16

## 2019-07-19 RX ADMIN — Medication 0.2 MG: at 13:56

## 2019-07-19 RX ADMIN — OXYCODONE HYDROCHLORIDE 10 MG: 5 TABLET ORAL at 23:14

## 2019-07-19 RX ADMIN — Medication 0.2 MG: at 08:13

## 2019-07-19 RX ADMIN — AMPICILLIN SODIUM AND SULBACTAM SODIUM 3 G: 2; 1 INJECTION, POWDER, FOR SOLUTION INTRAMUSCULAR; INTRAVENOUS at 03:45

## 2019-07-19 RX ADMIN — IOPAMIDOL 80 ML: 755 INJECTION, SOLUTION INTRAVENOUS at 12:49

## 2019-07-19 RX ADMIN — Medication 0.2 MG: at 16:03

## 2019-07-19 RX ADMIN — SODIUM CHLORIDE: 9 INJECTION, SOLUTION INTRAVENOUS at 06:12

## 2019-07-19 RX ADMIN — KETOROLAC TROMETHAMINE 30 MG: 30 INJECTION, SOLUTION INTRAMUSCULAR; INTRAVENOUS at 17:07

## 2019-07-19 RX ADMIN — CHLORHEXIDINE GLUCONATE 0.12% ORAL RINSE 15 ML: 1.2 LIQUID ORAL at 20:42

## 2019-07-19 RX ADMIN — OXYCODONE HYDROCHLORIDE 5 MG: 5 TABLET ORAL at 06:12

## 2019-07-19 RX ADMIN — Medication 0.2 MG: at 18:12

## 2019-07-19 RX ADMIN — Medication 250 MG: at 20:41

## 2019-07-19 RX ADMIN — CHLORHEXIDINE GLUCONATE 0.12% ORAL RINSE 15 ML: 1.2 LIQUID ORAL at 08:15

## 2019-07-19 RX ADMIN — ACETAMINOPHEN 975 MG: 325 TABLET, FILM COATED ORAL at 23:14

## 2019-07-19 RX ADMIN — Medication 0.2 MG: at 22:30

## 2019-07-19 RX ADMIN — SENNOSIDES AND DOCUSATE SODIUM 2 TABLET: 8.6; 5 TABLET ORAL at 20:41

## 2019-07-19 RX ADMIN — AMPICILLIN SODIUM AND SULBACTAM SODIUM 3 G: 2; 1 INJECTION, POWDER, FOR SOLUTION INTRAMUSCULAR; INTRAVENOUS at 16:02

## 2019-07-19 RX ADMIN — OXYCODONE HYDROCHLORIDE 10 MG: 5 TABLET ORAL at 11:24

## 2019-07-19 RX ADMIN — SODIUM CHLORIDE 50 ML: 9 INJECTION, SOLUTION INTRAVENOUS at 12:49

## 2019-07-19 RX ADMIN — Medication 0.2 MG: at 20:39

## 2019-07-19 RX ADMIN — ACETAMINOPHEN 975 MG: 325 TABLET, FILM COATED ORAL at 16:09

## 2019-07-19 RX ADMIN — Medication 250 MG: at 08:16

## 2019-07-19 RX ADMIN — OXYCODONE HYDROCHLORIDE 5 MG: 5 TABLET ORAL at 02:20

## 2019-07-19 RX ADMIN — AMPICILLIN SODIUM AND SULBACTAM SODIUM 3 G: 2; 1 INJECTION, POWDER, FOR SOLUTION INTRAMUSCULAR; INTRAVENOUS at 22:19

## 2019-07-19 RX ADMIN — KETOROLAC TROMETHAMINE 30 MG: 30 INJECTION, SOLUTION INTRAMUSCULAR at 05:21

## 2019-07-19 RX ADMIN — KETOROLAC TROMETHAMINE 30 MG: 30 INJECTION, SOLUTION INTRAMUSCULAR; INTRAVENOUS at 11:24

## 2019-07-19 RX ADMIN — OXYCODONE HYDROCHLORIDE 10 MG: 5 TABLET ORAL at 18:56

## 2019-07-19 RX ADMIN — Medication 0.2 MG: at 10:09

## 2019-07-19 RX ADMIN — VANCOMYCIN HYDROCHLORIDE 1750 MG: 10 INJECTION, POWDER, LYOPHILIZED, FOR SOLUTION INTRAVENOUS at 17:01

## 2019-07-19 RX ADMIN — ACETAMINOPHEN 975 MG: 325 TABLET, FILM COATED ORAL at 08:16

## 2019-07-19 RX ADMIN — AMPICILLIN SODIUM AND SULBACTAM SODIUM 3 G: 2; 1 INJECTION, POWDER, FOR SOLUTION INTRAMUSCULAR; INTRAVENOUS at 10:10

## 2019-07-19 RX ADMIN — SODIUM CHLORIDE: 9 INJECTION, SOLUTION INTRAVENOUS at 21:33

## 2019-07-19 ASSESSMENT — ACTIVITIES OF DAILY LIVING (ADL)
ADLS_ACUITY_SCORE: 11
ADLS_ACUITY_SCORE: 11

## 2019-07-19 NOTE — PLAN OF CARE
"      VS:   /76 (BP Location: Left arm)   Pulse 72   Temp 98.5  F (36.9  C) (Oral)   Resp 16   Ht 1.6 m (5' 3\")   Wt 83 kg (183 lb)   SpO2 100%   BMI 32.42 kg/m       Output:   Voiding without difficulty. Passing flatus   Lungs LS clear   Activity:   Up indep in room.    Skin: Edema L side of face, marked, penrose drain in place.    Pain:   Pain is managed with oxycodone, scheduled tylenol, IV toradol, IV dilaudid, ice packs    Neuro/CMS:   intact   Dressing(s):   N/a     Diet:   Dental soft diet, swallowing without difficulty. Pt was NPO for possible oral surgery tonight. Pt diet has been resumed.    LDA:   IV, drain   Equipment:   IV pump   Plan:   Discharge plan pending   Additional Info:   Dr. Dotson came to speak with pt re: change in treatment plan.       "

## 2019-07-19 NOTE — PROGRESS NOTES
SPIRITUAL HEALTH SERVICES  SPIRITUAL ASSESSMENT Progress Note  [facility] ([facility]) 10A     REFERRAL SOURCE: unit rounds    Pt Zahida expressed openness to a  visit but was about to begin an acupuncture session. I offered to come back tomorrow and she said that would be fine.    PLAN: I will attempt to visit Zahida tomorrow (7/20).    Phu John   Intern  Pager [phone]

## 2019-07-19 NOTE — PROGRESS NOTES
Acupuncture Clinical Internship Intake and Treatment Documentation   [facility]    Date:  7/19/2019  Patient Name:  Zahida Garcia   YOB: 1985     Repeat Patient:  no  Has patient had acupoint/acupressure treatment before:  yes    Signed consent placed in the medical record:  yes  Patient/Family verbalizes understanding of risks and benefits:  yes  Required information provided to patient:  yes    Diagnosis:  Dental abscess [K04.7]    Patient condition and treatment:  Dental abscess causing pain and swelling on left side of face  Reason for Intervention Today/Chief Complaint:  Pain in lower left jaw    Isolation:  No  Type:  None    PRE-SCORE:  moderate    Other Western medical information:  none    Medications   Current Facility-Administered Medications:      acetaminophen (TYLENOL) tablet 975 mg, 975 mg, Oral, TID, Fadia De Jesus PA-C, 975 mg at 07/19/19 0816     ampicillin-sulbactam (UNASYN) 3 g vial to attach to  mL bag, 3 g, Intravenous, Q6H, Bear Smith MD, 3 g at 07/19/19 1602     bisacodyl (DULCOLAX) Suppository 10 mg, 10 mg, Rectal, Daily PRN, Fadia De Jesus PA-C     chlorhexidine (PERIDEX) 0.12 % solution 15 mL, 15 mL, Swish & Spit, BID, Fadia De Jesus PA-C, 15 mL at 07/19/19 0815     glucose gel 15-30 g, 15-30 g, Oral, Q15 Min PRN **OR** dextrose 50 % injection 25-50 mL, 25-50 mL, Intravenous, Q15 Min PRN **OR** glucagon injection 1 mg, 1 mg, Subcutaneous, Q15 Min PRN, Fadia De Jesus PA-C     HYDROmorphone (DILAUDID) injection 0.2 mg, 0.2 mg, Intravenous, Q2H PRN, Fadia De Jesus PA-C, 0.2 mg at 07/19/19 1603     ketorolac (TORADOL) injection 30 mg, 30 mg, Intravenous, Q6H PRN, Fadia De Jesus PA-C, 30 mg at 07/19/19 1124     lidocaine (LMX4) cream, , Topical, Q1H PRN, Fadia De Jesus, RADHA     lidocaine 1 % 0.1-1 mL, 0.1-1 mL, Other, Q1H PRN, Bear Smith,  MD     melatonin tablet 1 mg, 1 mg, Oral, At Bedtime PRN, Bear Smith MD     naloxone (NARCAN) injection 0.1-0.4 mg, 0.1-0.4 mg, Intravenous, Q2 Min PRN, Bear Smith MD     ondansetron (ZOFRAN-ODT) ODT tab 4 mg, 4 mg, Oral, Q6H PRN **OR** ondansetron (ZOFRAN) injection 4 mg, 4 mg, Intravenous, Q6H PRN, Bear Smith MD     oxyCODONE (ROXICODONE) tablet 5-10 mg, 5-10 mg, Oral, Q4H PRN, Vishnu Tiwari MD, 10 mg at 07/19/19 1124     polyethylene glycol (MIRALAX/GLYCOLAX) Packet 17 g, 17 g, Oral, Daily PRN, Fadia De Jesus PA-C     saccharomyces boulardii (FLORASTOR) capsule 250 mg, 250 mg, Oral, BID, Fadia De Jesus PA-C, 250 mg at 07/19/19 0816     senna-docusate (SENOKOT-S/PERICOLACE) 8.6-50 MG per tablet 1 tablet, 1 tablet, Oral, BID **OR** senna-docusate (SENOKOT-S/PERICOLACE) 8.6-50 MG per tablet 2 tablet, 2 tablet, Oral, BID, Fadia De Jesus PA-C, 2 tablet at 07/19/19 0816     sodium chloride (PF) 0.9% PF flush 3 mL, 3 mL, Intracatheter, q1 min prn, Bear Smith MD     sodium chloride (PF) 0.9% PF flush 3 mL, 3 mL, Intracatheter, q1 min prn, Fadia De Jesus PA-C     sodium chloride (PF) 0.9% PF flush 3 mL, 3 mL, Intracatheter, Q8H, Fadia De Jesus PA-C     sodium chloride 0.9 % bag 500mL for CT scan flush use, 100 mL, Intravenous, Q1H PRN, Daksha Minaya MD, 50 mL at 07/19/19 1249     sodium chloride 0.9% infusion, , Intravenous, Continuous, Fadia De Jesus PA-C, Last Rate: 125 mL/hr at 07/19/19 0824     vancomycin (VANCOCIN) 1,250 mg in sodium chloride 0.9 % 250 mL intermittent infusion, 1,250 mg, Intravenous, Q12H, Jorge Luis Ballesteros MD      Pre-Treatment Assessment  Chief Complaint/ Reason for Intervention Today:  Pain in lower jaw on the left side  Chief Complaint Pre-Score:  moderate   Describe:  Patients lower jaw is swollen on the left side from an abscessed tooth. The pain is throbbing and at  its worst it is a 10/10. Chewing hurts and opening her mouth too wide hurts as well. Talking too much, laughing, and smiling aggravates the pain. Ice helps with the pain. This started almost a week ago on Saturday and she had a fever upon admission to the hospital but doesn't have a fever currently.   Pain Location:  Left side of lower jaw  Pre Session Pain:  Moderate  Pre Session Anxiety:  Mild  Pre Session Nausea:  None    10 Traditional Chinese Medicine Assessment Questions  - Cold/ Heat:  Goes between feeling really cold to feeling really hot.   - Sweat:  Gets night sweats  - Headaches/Body aches:  No headaches or pain anywhere else in the body.  - Chest/Abdomen:  No chest of abdominal symptoms  - Digestion:  No digestive issues  - Bowel Movement/Urination:  Stools are formed and easy to expel. No constipation or diarrhea. No issues with urination.  - Hearing/Vision:  No change in hearing or vision. Wears glasses. Denies visual disturbances. Gets tinnitus sometimes that can be either high pitched or low pitched.   - Sleep (prior to hospital):  Has insomnia where it is difficult to fall asleep due to racing thoughts.  - Energy:  Low energy, usually needs caffeine to get through the day  - Emotions:  Has anxiety that manifests with heart palpitations where she feels like she is having a heart attack. She also gets racing thoughts, especially about her daughter.  - Ob Gyn:  Last period was in June. She gets her period every month, but it is irregular in when it comes. Sometimes it is really heavy and sometimes it is really light where it seems like just spotting. She has severe cramping with her period and has lots of clots. Cramping is worse with pressure and better with heat.  - Miscellaneous:  none    Traditional Chinese Medicine Assessment  - TONGUE:  Thin white coat, edges are red and lack coat  - PULSE:  Slippery and tense on the left, wiry on the right  - OBSERVATIONS:  Patients jaw is very swollen on the  left side and she has a hard time sticking out her tongue very far.     Traditional Chinese Medicine Diagnosis  - BRANCH:  Toxic heat in the [place]  - ROOT:  Liver Qi Stagnation     Traditional Chinese Medicine Treatment  - ACUPUNCTURE:  St 44, GB 20, GB 21, Right LI 4, 11; Auricular: battlefield and Jaw on left, Point 0 and [place] on the right     Magnet informed consent signed and given:  no    Post Treatment Assessment  Chief complaint post score:  better  Post Session Observation:  Patient is relaxed, but still in some pain  Patient/Family Education:  Went over informed consent  Verbal information provided:  yes  Written information provided:  no  All questions answered at time of treatment:  yes    Treatment/Procedure(s) performed by:  Kristin Kelly    Date: 7/19/2019     I attest that this acupuncture treatment was done under my supervision and this note is complete and true.     Supervising acupuncturist:  Kuldip Douglas LAc [facility] Acupuncture license #: [id number]  P: [phone]

## 2019-07-19 NOTE — PROGRESS NOTES
Interval History: Patient was seen in the ED Thursday 7/18/19 at approximately 2:30 am for a vestibular incision and drainage of the left mandible. Visited the patient at 7:00 am to evaluate how she was feeling (approximately 26 hours post-op).         Exam:      General: Patient lying in bed comfortable when I visited. Patient states that she feels more swollen than she was when she arrived at the ED but overall still feel better. Patient is still speaking much clearer than pre-op and denies difficulty swallowing. Patient states that she is not in pain currently but that she is when her medication starts to wear off.      Extraoral exam: Patient's swelling has increased slightly since yesterday. The area was marked with a surgical pen. Inferior border of the mandible is unable to be palpated. Swelling is feels more indurated than yesterday and patient reacts in pain when palpating the area.     Intraoral exam: Patient still has limited opening. Approximately two fingers. Negative for raised tongue or FOM swelling. Drain is in place, patient claims she is being careful not to disturb it.          Assessment: Patient's general habitus has not changed significantly since the last encounter yesterday. Indicates that the patient is recovering and that the current swelling may be post-operative inflammation.         Plan: Continue medication and monitoring. Patient will be re-evaluated later today.             Timoteo Dotson DDS   PGY-1  Pager: [phone]

## 2019-07-19 NOTE — PROGRESS NOTES
[facility] Progress Note - Hospitalist Service       Date of Admission:  7/18/2019  Assessment & Plan      Zahida Garcia is a 34 year old female admitted on 7/18/2019 for dental abscess.     #Dental abscess: In setting of recent root canal, worsening pain and swelling x4 days, failed OP antibiotics. Presented to OSH ED, CT w/ moderate soft tissue swelling and inflammatory changes involving the apical aspect of the left hemimandible w/ periapical lucency involving tooth #19, immediately adjacent to that tooth, there is 2.5 x 13 mm fluid collection along the buccal soft tissues. S/P stab incision and debridement w/ purulent drainage, drain placed by OMFS. WBC count normal and remains afebrile, but worsening edema today. VS w/ normotension, normal HR, afebrile.   -Dental and OMFS following, discussing plan for potential transfer to medicine on [place], discussed with OMFS at 1620, no need for further surgical intervention or transfer, will monitor on [facility] with general dental team over the weekend  -May need to pursue additional imaging & procedure pending dental/omfs recs, call out to team to discuss  -Continue IV antibiotics w/ Unasyn Q6 while on obs, can transition to oral Augmentin on discharge (875 mg BID x7-14 days), discussed with Dr. Ballesteros, will add vancomycin given concern for osteo  -NS at 125 ml/hour while on obs  -Pain management w/ scheduled tylenol, PRN toradol, PRN oxycodone (dose increased this AM), PRN dilaudid for severe pain  -Bowel regimen ordered  -Anti emetics available  -Continue florastor while on antibiotics  -Oral cares per Dental, per OMFS, needs to follow up with dental clinic in 2-3 days for consideration of pulling drain and continued f/u      #Hypoglycemia: BG of 67 this AM. Likely due to poor oral intake in setting of dental abscess. Drinking juice this AM. Will have D50 available and spot check BG PRN for symptoms of  hypoglycemia.     Diet: Advance Diet as Tolerated: Regular Diet Adult; Mechanical/Dental Soft Diet    DVT Prophylaxis: Pneumatic Compression Devices and Ambulate every shift  Adamson Catheter: not present  Code Status: Full Code      Disposition Plan   Expected discharge: Tomorrow, recommended to prior living arrangement once adequate pain management/ tolerating PO medications, plan for dental infection and worsening swelling.  Entered: Fadia Frost PA-C 07/19/2019, 9:58 AM       The patient's care was discussed with the Attending Physician, Dr. Ballesteros, Bedside Nurse, Patient and Dental Consultant.    Fadia Frost PA-C  Hospitalist Service  [facility], [place]    ______________________________________________________________________    Interval History   The patient notes pain was out of control this AM, now feels better after IV dilaudid. Notes dental came in today and were concerned about worsening swelling. She notes she also feels that swelling is worse. No change in secretions, feels she is managing these okay. Denies dyspnea or trouble breathing. Notes chills overnight but no fevers.     Data reviewed today: I reviewed all medications, new labs and imaging results over the last 24 hours. I personally reviewed no images or EKG's today.    Physical Exam   Vital Signs: Temp: 97.8  F (36.6  C) Temp src: Oral BP: 102/65 Pulse: 72 Heart Rate: 70 Resp: 16 SpO2: 98 % O2 Device: None (Room air)    Weight: 183 lbs 0 oz  GENERAL: Alert and oriented x 3. Sitting up comfortably in bed drinking juice.   HEENT: Anicteric sclera. Mucous membranes moist and without lesions. Able to open mouth 1 cm, edema, warmth, TTP over left inferior buccal area, edema extends into neck w/o obvious surrounding cellulitis.   CV: RRR. S1, S2. No murmurs appreciated.   RESPIRATORY: Effort normal on RA. Lungs CTAB with no wheezing, rales, rhonchi.   GI: Abdomen soft and non distended, bowel sounds  present. No tenderness, rebound, guarding.   MUSCULOSKELETAL: No joint swelling or tenderness. Moves all extremities.   NEUROLOGICAL: No focal deficits.   EXTREMITIES: No peripheral edema. Intact bilateral pedal pulses.   SKIN: No jaundice. No rashes.       Data   Reviewed CBC, BMP

## 2019-07-19 NOTE — PROGRESS NOTES
Interval History: Patient was seen in the ED Thursday 7/18/19 at approximately 2:30 am for a vestibular incision and drainage of the left mandible. Visited the patient at 3:45 am to evaluate condition (approximately 37 hours post-op). Dr. Damon Hope was present for the evaluation.         Exam:      General: Patient lying in bed comfortable when I visited. Seated upright, she was receiving acupuncture therapy for her jaw trismus and pain. Patient states that she is still not in pain currently (despite receiving acupuncture for pain) but that she is on IV dilauded so she could be and not know it. Patient's confirms her voice sounds normal. Patient denies any difficult breathing or pain when swallowing.      Extraoral exam: Patient's swelling has remained stable since earlier visit today.  Inferior border of the mandible is unable to be palpated. Swelling continues to feel more indurated despite getting larger. Patient states the area is tender when palpated.      Intraoral exam: Patient still has limited opening. Approximately one and a half fingers. Negative for raised tongue or FOM swelling. Drain is in place. Drain location may not be optimal for drainage of infected spaces.     Imaging: a CT radiograph was obtained and interpreted by OMFS when consulted. Patient has swelling but OMFS reports no findings that would necessitate bringing patient to the OR.    Consultation with OMFS/Recommendations:   Patient was discussed with Dr. Sharon Roland of OMFS. Recommendation for patient are.    - Manage post-operative swelling with NSAIDs if not contraindicated for patient.   - Intermittent warm packs on the area of swelling  - Patient should practice opening and closing to increase range of motion  - Patient should not continue to eat and maintain fluids.   - Patient can be removed from IV dilaudid.           Assessment: Patient's general habitus has not changed significantly since the last encounter. Indicates that the  patient is recovering and that the current swelling may be post-operative inflammation. Asssessment confirmed by OMFS.        Plan: Continue medication and monitoring. Follow OMFS recommendations.      Patient discussed with: Dr. Sharon Roland DDS, Dr. Vidal Gant DDS, Dr. Damon Hope DMD, Fadia Dotson DDS   PGY-1  Pager: [phone]

## 2019-07-19 NOTE — PLAN OF CARE
OBS GOALS:   -diagnostic tests and consults completed and resulted   -vital signs normal or at patient baseline   -tolerating oral intake to maintain hydration   -adequate pain control on oral analgesics   -tolerating oral antibiotics or has plans for home infusion setup   -returns to baseline functional status   -safe disposition plan has been identified     0800: VSS, tolerating dental soft diet, on oxycodone 10 mg, IV dilaudid x1 for breakthrough pain, cold packs to L face, on IV abx, up indep, discharge plan pending.   1000: VSS, tolerating dental soft diet, on oxycodone 10 mg, IV dilaudid x1 for breakthrough pain, cold packs to L face, on IV abx, up indep, discharge plan pending.   1200 ; CT scan ordered, VSS, tolerating dental soft diet, oxycodone 10 mg for pain, IV toradol x1 for pain, cold packs to L face, c/o increased pain and swelling, up indep, discharge plan pending.  1400: CT completed, VSS,  tolerating dental soft diet, oxycodone 10 mg for pain, cold packs to L face,  up indep, discharge plan pending.

## 2019-07-19 NOTE — PHARMACY-VANCOMYCIN DOSING SERVICE
Pharmacy Vancomycin Initial Note  Date of Service 2019  Patient's  1985  34 year old, female    Indication: Abscessed dental infection, possible osteomyelitis    Current estimated CrCl = Estimated Creatinine Clearance: 155.5 mL/min (A) (based on SCr of 0.51 mg/dL (L)).    Creatinine for last 3 days  2019:  6:49 AM Creatinine 0.51 mg/dL    Recent Vancomycin Level(s) for last 3 days  No results found for requested labs within last 72 hours.      Vancomycin IV Administrations (past 72 hours)      No vancomycin orders with administrations in past 72 hours.                Nephrotoxins and other renal medications (From now, onward)    Start     Dose/Rate Route Frequency Ordered Stop    19 1130  ketorolac (TORADOL) injection 30 mg      30 mg Intravenous EVERY 6 HOURS PRN 19 0957 19 1129    19 1600  ampicillin-sulbactam (UNASYN) 3 g vial to attach to  mL bag      3 g  over 1 Hours Intravenous EVERY 6 HOURS 19 0936            Contrast Orders - past 72 hours (72h ago, onward)    Start     Dose/Rate Route Frequency Ordered Stop    19 1245  iopamidol (ISOVUE-370) solution 100 mL      100 mL Intravenous ONCE 19 1232 19 1249                Plan:  1.  Start vancomycin  1750 mg IV q12h.   2.  Goal Trough Level: 15-20 mg/L   3.  Pharmacy will check trough levels as appropriate in 1-3 Days.    4. Serum creatinine levels will be ordered a minimum of twice weekly.      Alisha Kirk, PharmD, BCPS 2019

## 2019-07-19 NOTE — PLAN OF CARE
A/O x 4. Pt reports that pain is improved it had been intolerable and was weepy at the early part of night.t 5 mg of oxycodone is not lasting for the entire 4 hours, and pt still has some pain after getting it. Torodol, tylenol, and IVP dilaudid has been given for pain and pt did get some sleep overnight.  Text page sent to house physician Left side of face remains edematous. Ice packs replaced several times overnight. IV antibiotics given as ordered. Pt able to get up independently to the bathroom. Pt has  Penrose drain in left side of mouth. No problems with swallowing noted. Only clear liquids overnight. No nausea or emesis noted. Call light is in reach. Cont to assess

## 2019-07-19 NOTE — PLAN OF CARE
"0745  vital signs normal or at patient baseline : Low grade temp  -tolerating oral intake to maintain hydration: poor appetite tolerates soup/clears   -adequate pain control on oral analgesics: no, needs IV dilaudid an toradol   -tolerating oral antibiotics or has plans for home infusion setup: IV antibiotics  -returns to baseline functional status ;independent  -safe disposition plan has been identified /not ready tp WA  0945  -vital signs normal or at patient baseline: low grade temp  -tolerating oral intake to maintain hydration: yes   -adequate pain control on oral analgesics : no needs IV pain meds   -tolerating oral antibiotics or has plans for home infusion setup : IV antibiotics.   -returns to baseline functional status : yes, independent to the bathroom   -safe disposition plan has been identified   2345  -vital signs normal or at patient baseline \": low grade temp   -tolerating oral intake to maintain hydration ;yes  -adequate pain control on oral analgesics: IV pain meds  -tolerating oral antibiotics or has plans for home infusion setup : IV antibiotics   -returns to baseline functional status : yes  -safe disposition plan has been identified /not ready to d/c  0145  -vital signs normal or at patient baseline : low grade temp  -tolerating oral intake to maintain hydration: yes   -adequate pain control on oral analgesics: no IV meds since last shift.   -tolerating oral antibiotics or has plans for home infusion setup: IV antibiotics.    -returns to baseline functional status Yes, no dizziness, unsteady gait  -safe disposition plan has been identified:not ready to d/c  0345:   -vital signs normal or at patient baseline:   -tolerating oral intake to maintain hydration : yes   -adequate pain control on oral analgesics : no IV meds since last shift.   -tolerating oral antibiotics or has plans for home infusion setup: IV antibiotics   -returns to baseline functional status Yes   -safe disposition plan has been " identified Not ready for d/c  0545  vital signs normal or at patient baseline: afebrile  -tolerating oral intake to maintain hydration : yes   -adequate pain control on oral analgesics : no IV meds since last shift.   -tolerating oral antibiotics or has plans for home infusion setup: IV antibiotics   -returns to baseline functional status Yes   -safe disposition plan has been identified /not ready to D/C

## 2019-07-20 LAB
ALBUMIN SERPL-MCNC: 2.3 G/DL (ref 3.4–5)
ALP SERPL-CCNC: 37 U/L (ref 40–150)
ALT SERPL W P-5'-P-CCNC: 11 U/L (ref 0–50)
ANION GAP SERPL CALCULATED.3IONS-SCNC: 5 MMOL/L (ref 3–14)
AST SERPL W P-5'-P-CCNC: 14 U/L (ref 0–45)
BILIRUB DIRECT SERPL-MCNC: <0.1 MG/DL (ref 0–0.2)
BILIRUB SERPL-MCNC: 0.2 MG/DL (ref 0.2–1.3)
BUN SERPL-MCNC: 4 MG/DL (ref 7–30)
CALCIUM SERPL-MCNC: 7.6 MG/DL (ref 8.5–10.1)
CHLORIDE SERPL-SCNC: 112 MMOL/L (ref 94–109)
CO2 SERPL-SCNC: 26 MMOL/L (ref 20–32)
CREAT SERPL-MCNC: 0.47 MG/DL (ref 0.52–1.04)
ERYTHROCYTE [DISTWIDTH] IN BLOOD BY AUTOMATED COUNT: 13.4 % (ref 10–15)
GFR SERPL CREATININE-BSD FRML MDRD: >90 ML/MIN/{1.73_M2}
GLUCOSE SERPL-MCNC: 67 MG/DL (ref 70–99)
HCT VFR BLD AUTO: 29.2 % (ref 35–47)
HGB BLD-MCNC: 9.9 G/DL (ref 11.7–15.7)
MCH RBC QN AUTO: 29 PG (ref 26.5–33)
MCHC RBC AUTO-ENTMCNC: 33.9 G/DL (ref 31.5–36.5)
MCV RBC AUTO: 86 FL (ref 78–100)
PLATELET # BLD AUTO: 281 10E9/L (ref 150–450)
POTASSIUM SERPL-SCNC: 3.5 MMOL/L (ref 3.4–5.3)
PROT SERPL-MCNC: 5.4 G/DL (ref 6.8–8.8)
RBC # BLD AUTO: 3.41 10E12/L (ref 3.8–5.2)
SODIUM SERPL-SCNC: 143 MMOL/L (ref 133–144)
VANCOMYCIN SERPL-MCNC: 7.3 MG/L
WBC # BLD AUTO: 6.1 10E9/L (ref 4–11)

## 2019-07-20 PROCEDURE — 80076 HEPATIC FUNCTION PANEL: CPT | Performed by: PHYSICIAN ASSISTANT

## 2019-07-20 PROCEDURE — 25800030 ZZH RX IP 258 OP 636: Performed by: INTERNAL MEDICINE

## 2019-07-20 PROCEDURE — 36415 COLL VENOUS BLD VENIPUNCTURE: CPT | Performed by: PHYSICIAN ASSISTANT

## 2019-07-20 PROCEDURE — 25000128 H RX IP 250 OP 636: Performed by: INTERNAL MEDICINE

## 2019-07-20 PROCEDURE — 80202 ASSAY OF VANCOMYCIN: CPT | Performed by: INTERNAL MEDICINE

## 2019-07-20 PROCEDURE — 25000128 H RX IP 250 OP 636: Performed by: PHYSICIAN ASSISTANT

## 2019-07-20 PROCEDURE — 25000132 ZZH RX MED GY IP 250 OP 250 PS 637: Performed by: INTERNAL MEDICINE

## 2019-07-20 PROCEDURE — 36415 COLL VENOUS BLD VENIPUNCTURE: CPT | Performed by: INTERNAL MEDICINE

## 2019-07-20 PROCEDURE — 12000001 ZZH R&B MED SURG/OB UMMC

## 2019-07-20 PROCEDURE — 99233 SBSQ HOSP IP/OBS HIGH 50: CPT | Performed by: INTERNAL MEDICINE

## 2019-07-20 PROCEDURE — 25000128 H RX IP 250 OP 636: Performed by: HOSPITALIST

## 2019-07-20 PROCEDURE — 25800030 ZZH RX IP 258 OP 636

## 2019-07-20 PROCEDURE — 85027 COMPLETE CBC AUTOMATED: CPT | Performed by: PHYSICIAN ASSISTANT

## 2019-07-20 PROCEDURE — 25000132 ZZH RX MED GY IP 250 OP 250 PS 637: Performed by: PHYSICIAN ASSISTANT

## 2019-07-20 PROCEDURE — 80048 BASIC METABOLIC PNL TOTAL CA: CPT | Performed by: PHYSICIAN ASSISTANT

## 2019-07-20 RX ORDER — KETOROLAC TROMETHAMINE 15 MG/ML
15 INJECTION, SOLUTION INTRAMUSCULAR; INTRAVENOUS EVERY 6 HOURS PRN
Status: DISCONTINUED | OUTPATIENT
Start: 2019-07-20 | End: 2019-07-23 | Stop reason: HOSPADM

## 2019-07-20 RX ORDER — DEXTROSE MONOHYDRATE, SODIUM CHLORIDE, AND POTASSIUM CHLORIDE 50; 1.49; 4.5 G/1000ML; G/1000ML; G/1000ML
INJECTION, SOLUTION INTRAVENOUS
Status: COMPLETED
Start: 2019-07-20 | End: 2019-07-20

## 2019-07-20 RX ORDER — DEXTROSE MONOHYDRATE, SODIUM CHLORIDE, AND POTASSIUM CHLORIDE 50; 1.49; 4.5 G/1000ML; G/1000ML; G/1000ML
INJECTION, SOLUTION INTRAVENOUS CONTINUOUS
Status: DISCONTINUED | OUTPATIENT
Start: 2019-07-20 | End: 2019-07-22

## 2019-07-20 RX ORDER — HYDROMORPHONE HYDROCHLORIDE 2 MG/1
2-4 TABLET ORAL EVERY 4 HOURS PRN
Status: DISCONTINUED | OUTPATIENT
Start: 2019-07-20 | End: 2019-07-23 | Stop reason: HOSPADM

## 2019-07-20 RX ORDER — PANTOPRAZOLE SODIUM 40 MG/1
40 TABLET, DELAYED RELEASE ORAL
Status: DISCONTINUED | OUTPATIENT
Start: 2019-07-20 | End: 2019-07-23 | Stop reason: HOSPADM

## 2019-07-20 RX ORDER — HYDROMORPHONE HYDROCHLORIDE 2 MG/1
2 TABLET ORAL EVERY 4 HOURS PRN
Status: DISCONTINUED | OUTPATIENT
Start: 2019-07-20 | End: 2019-07-20

## 2019-07-20 RX ADMIN — KETOROLAC TROMETHAMINE 15 MG: 15 INJECTION, SOLUTION INTRAMUSCULAR; INTRAVENOUS at 20:38

## 2019-07-20 RX ADMIN — Medication 0.2 MG: at 06:51

## 2019-07-20 RX ADMIN — ONDANSETRON 4 MG: 2 INJECTION INTRAMUSCULAR; INTRAVENOUS at 08:11

## 2019-07-20 RX ADMIN — Medication 0.2 MG: at 18:02

## 2019-07-20 RX ADMIN — AMPICILLIN SODIUM AND SULBACTAM SODIUM 3 G: 2; 1 INJECTION, POWDER, FOR SOLUTION INTRAMUSCULAR; INTRAVENOUS at 03:42

## 2019-07-20 RX ADMIN — VANCOMYCIN HYDROCHLORIDE 1750 MG: 10 INJECTION, POWDER, LYOPHILIZED, FOR SOLUTION INTRAVENOUS at 18:07

## 2019-07-20 RX ADMIN — VANCOMYCIN HYDROCHLORIDE 1750 MG: 10 INJECTION, POWDER, LYOPHILIZED, FOR SOLUTION INTRAVENOUS at 04:55

## 2019-07-20 RX ADMIN — Medication 0.2 MG: at 08:56

## 2019-07-20 RX ADMIN — KETOROLAC TROMETHAMINE 30 MG: 30 INJECTION, SOLUTION INTRAMUSCULAR; INTRAVENOUS at 01:36

## 2019-07-20 RX ADMIN — POTASSIUM CHLORIDE, DEXTROSE MONOHYDRATE AND SODIUM CHLORIDE: 150; 5; 450 INJECTION, SOLUTION INTRAVENOUS at 11:22

## 2019-07-20 RX ADMIN — Medication 0.2 MG: at 00:59

## 2019-07-20 RX ADMIN — AMPICILLIN SODIUM AND SULBACTAM SODIUM 3 G: 2; 1 INJECTION, POWDER, FOR SOLUTION INTRAMUSCULAR; INTRAVENOUS at 10:33

## 2019-07-20 RX ADMIN — Medication 250 MG: at 08:49

## 2019-07-20 RX ADMIN — OXYCODONE HYDROCHLORIDE 10 MG: 5 TABLET ORAL at 03:41

## 2019-07-20 RX ADMIN — Medication 0.2 MG: at 05:05

## 2019-07-20 RX ADMIN — Medication 0.2 MG: at 03:07

## 2019-07-20 RX ADMIN — OXYCODONE HYDROCHLORIDE 10 MG: 5 TABLET ORAL at 07:20

## 2019-07-20 RX ADMIN — CHLORHEXIDINE GLUCONATE 0.12% ORAL RINSE 15 ML: 1.2 LIQUID ORAL at 20:33

## 2019-07-20 RX ADMIN — PANTOPRAZOLE SODIUM 40 MG: 40 TABLET, DELAYED RELEASE ORAL at 10:37

## 2019-07-20 RX ADMIN — SENNOSIDES AND DOCUSATE SODIUM 1 TABLET: 8.6; 5 TABLET ORAL at 08:50

## 2019-07-20 RX ADMIN — ACETAMINOPHEN 975 MG: 325 TABLET, FILM COATED ORAL at 08:49

## 2019-07-20 RX ADMIN — HYDROMORPHONE HYDROCHLORIDE 4 MG: 2 TABLET ORAL at 23:22

## 2019-07-20 RX ADMIN — KETOROLAC TROMETHAMINE 15 MG: 15 INJECTION, SOLUTION INTRAMUSCULAR; INTRAVENOUS at 12:47

## 2019-07-20 RX ADMIN — HYDROMORPHONE HYDROCHLORIDE 4 MG: 2 TABLET ORAL at 14:03

## 2019-07-20 RX ADMIN — ACETAMINOPHEN 975 MG: 325 TABLET, FILM COATED ORAL at 16:32

## 2019-07-20 RX ADMIN — AMPICILLIN SODIUM AND SULBACTAM SODIUM 3 G: 2; 1 INJECTION, POWDER, FOR SOLUTION INTRAMUSCULAR; INTRAVENOUS at 16:50

## 2019-07-20 RX ADMIN — OXYCODONE HYDROCHLORIDE 10 MG: 5 TABLET ORAL at 10:33

## 2019-07-20 RX ADMIN — CHLORHEXIDINE GLUCONATE 0.12% ORAL RINSE 15 ML: 1.2 LIQUID ORAL at 08:49

## 2019-07-20 RX ADMIN — HYDROMORPHONE HYDROCHLORIDE 4 MG: 2 TABLET ORAL at 19:00

## 2019-07-20 ASSESSMENT — ACTIVITIES OF DAILY LIVING (ADL)
ADLS_ACUITY_SCORE: 11

## 2019-07-20 ASSESSMENT — PAIN DESCRIPTION - DESCRIPTORS: DESCRIPTORS: STABBING

## 2019-07-20 NOTE — CONSULTS
Dental Service Progress Note    Interval History   On 7/18/2019, patient was seen in the ED for an incision and drainage of the left mandible because of swelling and pain. Patient got admitted to the hospital after the procedure because the pain and swelling got worse. Visited the patient Friday 07/19/2019 for a follow up as well as Saturday 07/20/2019.      Exam     General : When I came in the room on 07/20/2019, patient was sleeping comfortably in the bed. Patient states that she is feeling really better since yesterday. Patient states that the warm packs helped for the swelling. Patient states that the pain decreased significantly (she states having more pain at night because she grinds and doesn't have her mouth guard with her), that the opening of her jaw, chewing and diet improved, her voice sounds normal, she feels she can speak with less difficulty (voice is clearer). Also, patient denies any difficulty breathing or pain when swallowing.     Extraoral exam : The swelling has decreased slightly with mild central induration. Inferior of mandible is still unable to be palpated. Patient feels less tenderness and pain when palpated.    Intraoral exam : Patient's opening improved from yesterday but is still limited. (Approximately 2 fingers). Negative for FOM swelling and raised tongue. The drain is still in place.    Imaging : After interpretation, a CT radiograph shows swelling of the left mandible and OMFS do not recommend doing a more invasive procedure according th their findings.    Continue OMFS recommendation which are :   - Manage post-op swelling with NSAIDs  - Intermittent warm packs on the area  - Open and close exercises  - Drink fluids and eat  - Off from IV dilaudid    Assessment  Patient's general habitus changed slightly since the last encounter (07/19/2019). Patient is recovering.    Plan  Monitor clinically and continue medication. Follow OMFS recommendations. Expected discharge in 2-3 days  once patient is on po AB's        Damon Hope, DMD  PGY-1  Pager: [phone]

## 2019-07-20 NOTE — PROVIDER NOTIFICATION
Pt reported change in pain, now stabbing pain spreading medially to sub-mandibular area. Hospitalist notified, recommended next available Toradol dose and change to ice packs from hot packs. Will continue to assess.

## 2019-07-20 NOTE — PROGRESS NOTES
"SPIRITUAL HEALTH SERVICES  SPIRITUAL ASSESSMENT Progress Note  [facility] ([facility] [place]) 10A     PRIMARY FOCUS:     Support for coping    REFERRAL SOURCE: follow-up from pt request yesterday    ILLNESS CIRCUMSTANCES:   Reviewed documentation. Reflective conversation shared with Zahida which integrated elements of illness and family narratives.     Context of Serious Illness/Symptom(s) - Zahida asked her  not to visit with their children because she is afraid of how much her kids would cry.    DISTRESS:     Emotional/Spiritual/Existential Distress - Zahida noted her significant stress yesterday because of uncertainty over her condition - \"I had no idea what was going on.\" She feels significantly better today, both in terms of pain and in having a plan.    SPIRITUAL/Temple COPING:     Scientologist/Radha - \"I'm Zoroastrian by family, but we all pray to the same God\"    Spiritual Practice(s) - Zahida asked for a prayer, which I offered; she declined to be put on the list for Zoroastrian Communion.    Emotional/Relational/Existential Connections - Zahida cited family as her most important source of support. Her parents and sister live in the area and help her  to care for her children while she is in the hospital.    GOALS OF CARE:    Goals of Care - She hopes to discharge in the next day or two and return to family and work.    PLAN: [facility] remains available for the duration of hospitalization.    Phu John   Intern  Pager [phone]    "

## 2019-07-20 NOTE — PROGRESS NOTES
"[facility] Internal Medicine Progress Note            Interval History:   Record reviewed including dental/OMFS follow up.  Discussed with JOSE and Dr. Ballesteros.  Seen with RN.  Remains on IV Unsayn with addition of IV vanco 7/19.  No present [plans for surgery per dental/OMFS.   Interval decrease left facial swelling.  Improved ability to open mandible, chew and tolerate diet.  Persistent pain with regular low dose IV dilaudid requirement.  PRN 30 mg toradol IV.   GI upset with ibuprofen.  No chills, sweats.  Mild left temporal parietal HA.  Ambulatory without LH.   No CP, SOB, cough.  Nausea earlier relieved with zofran - attributed to taking meds on empty stomach.  No reflux, abd pain.  Last BM 7/19.  Voiding OK.            Medications:   All medications reviewed today          Physical Exam:   Blood pressure 101/62, pulse 60, temperature 98.1  F (36.7  C), temperature source Oral, resp. rate 16, height 1.6 m (5' 3\"), weight 83 kg (183 lb), SpO2 99 %.  No intake or output data in the 24 hours ending 07/20/19 1044    General:  Alert.  Appropriate.  No distress.  No O2.     Heent:  Moderate persistent left facial swelling with mild central erythema and induration.  No appreciable fluctuance (indicates improved ability to tolerate palpation).    Neck:    Skin:    Chest:  clear    Cardiac:  Reg without gallop, murmur.  No JVD.     Abdomen:  Non distended, soft, non-tender.  BS normal.     Extremities:  Perfused.  No edema, calf, posterior thigh tenderness to suggest DVT.     Neuro:            Data:     Results for orders placed or performed during the hospital encounter of 07/18/19 (from the past 24 hour(s))   CT Soft Tissue Neck w Contrast    Narrative    CT SOFT TISSUE NECK W CONTRAST 7/19/2019 12:56 PM    History:  dental infection w/ worsening edema and pain- re eval  ICD-10:      Comparison:  None available     Technique: Following intravenous administration of nonionic iodinated  contrast medium, thin " section helical CT images were obtained from the  skull base down to the level of the aortic arch.  Axial, coronal and  sagittal reformations were performed with 2-3 mm slice thickness  reconstruction. Images were reviewed in soft tissue, lung and bone  windows.    Contrast: 80 ml's isovue 370    Findings:  Postprocedural changes of incision and drainage for  previously reported abscess (images are not available for comparison)  with drain placement in the left buccal soft tissues. There is  extensive soft tissue edema and fat stranding along the left  hemimandible extending anteromedially along the anterior neck to the  level of the thyroid. There is thickening of the platysma and  innumerable reactive cervical lymph nodes bilaterally. There is apical  lucency involving the 17th and 18th molars with sclerotic changes  within the adjacent mandible.. Sclerotic lesion of the mandible at the  apex of the 18th molar. There is no residual drainable fluid  collection in the mucosal bed. No significant sublingual edema.  Hypertrophy of the palatine tonsils with mild narrowing of the  oropharynx at that level. The uvula is edematous. There is effacement  of the left piriform sinus, possibly due to secretions.     The nasopharynx and glottis are unremarkable. The major salivary  glands appear unremarkable. The thyroid gland appears normal.    The fascial spaces in the neck are intact bilaterally. The major  vascular structures in the neck appear unremarkable.    No overt spinal canal or neuroforaminal stenosis. The visualized  paranasal sinuses are clear. The mastoid air cells are clear.     The visualized lung apices are clear.      Impression    Impression:  1. Postprocedural changes of incision and drainage for previously  reported odontogenic abscess with drain placement in the left buccal  soft tissues. Extensive soft tissue edema along the left buccal mucosa  and soft tissues of the left mandible extending  anteromedially along  the anterior neck to the level of the thyroid, compatible with  cellulitis. Bilateral reactive cervical lymph nodes.  2. Odontogenic disease involving the 17th and 18th molars with  surrounding sclerosis in the mandible, which is concerning for  osteomyelitis.    I have personally reviewed the examination and initial interpretation  and I agree with the findings.    ABRAHAM SAAB MD   Methicillin Resistant Staph Aureus PCR   Result Value Ref Range    Specimen Description Nares     Methicillin Resist/Sens S. aureus PCR Negative NEG^Negative   Glucose by meter   Result Value Ref Range    Glucose 85 70 - 99 mg/dL   CBC with platelets   Result Value Ref Range    WBC 6.1 4.0 - 11.0 10e9/L    RBC Count 3.41 (L) 3.8 - 5.2 10e12/L    Hemoglobin 9.9 (L) 11.7 - 15.7 g/dL    Hematocrit 29.2 (L) 35.0 - 47.0 %    MCV 86 78 - 100 fl    MCH 29.0 26.5 - 33.0 pg    MCHC 33.9 31.5 - 36.5 g/dL    RDW 13.4 10.0 - 15.0 %    Platelet Count 281 150 - 450 10e9/L   Basic metabolic panel   Result Value Ref Range    Sodium 143 133 - 144 mmol/L    Potassium 3.5 3.4 - 5.3 mmol/L    Chloride 112 (H) 94 - 109 mmol/L    Carbon Dioxide 26 20 - 32 mmol/L    Anion Gap 5 3 - 14 mmol/L    Glucose 67 (L) 70 - 99 mg/dL    Urea Nitrogen 4 (L) 7 - 30 mg/dL    Creatinine 0.47 (L) 0.52 - 1.04 mg/dL    GFR Estimate >90 >60 mL/min/[1.73_m2]    GFR Estimate If Black >90 >60 mL/min/[1.73_m2]    Calcium 7.6 (L) 8.5 - 10.1 mg/dL              Assessment and Plan:   1)  Dental abscess: In setting of recent root canal, worsening pain and swelling x4 days, failed OP antibiotics (augmentin). Presented to OSH ED, CT w/ moderate soft tissue swelling and inflammatory changes involving the apical aspect of the left hemimandible w/ periapical lucency involving tooth #19, immediately adjacent to that tooth, there is 2.5 x 13 mm fluid collection along the buccal soft tissues. S/P stab incision and debridement w/ purulent drainage, drain placed by OMFS  (here). No culture data.  Interval improvement on IV Unasyn plus Vanco.  Extensive edema with question mandibular sclerosis concerning for osteomyelitis (no comment in dental note).    2)  Hypoglycemia: BG  60-80s.   Likely due to poor oral intake in setting of dental abscess. No symptoms.  Improved diet tolerance.  3)  Nausea likely med effect.  Possible opiate related (oxycodone). On ibuprofen PTA.  No ongoing acid peptic symptoms.        PLAN:  1)  Review meds, orders.  2)  Add po dilaudid.  Attempt to wean IV dilaudid and toradol.  Scheduled tylenol.  Defer ibuprofen due to potential GI intolerance.  Decrease toradol to 15 mg.  Empiric protonix.  3)  Add hepatic profile to labs.  4)  Continue IV with dextrose with low BG.  Continue IV vanco and Unasyn.  Consider ID input as no culture data.  5)  Dental follow up regarding question of osteomyelitis.  6)  Mobilize as tolerated.  7)  Trend labs.  Monitor clinically.   Disposition Plan   Expected discharge in 2-3 days to prior living arrangement once transitioned to po ABs. .     Entered: Channing Fernandez 07/20/2019, 10:44 AM              Attestation:  I have reviewed today's vital signs, notes, medications, labs and imaging.     Channing Fernandez MD

## 2019-07-20 NOTE — PLAN OF CARE
A/O x 4. VSS. Pt had nausea this am, zofran given with relief. Pain management adjusted, managed now with po dilaudid, IV toradol. BG 64, K 3.5, IVF initiated.Pt up indep in room and couch. Voiding without difficulty. States she feels much better this pm, tolerating soft food much easier. Edema has decreased on L face from marked area. Discharge plan pending, continue POC.

## 2019-07-20 NOTE — PHARMACY-VANCOMYCIN DOSING SERVICE
Pharmacy Vancomycin Note  Date of Service 2019  Patient's  1985   34 year old, female    Indication: Dental abscess, possible osteomyelitis  Goal Trough Level: 15-20 mg/L  Day of Therapy: 2  Current Vancomycin regimen:  1750 mg IV q12h (21 mg/kg based on ABW of 83 kg)    Current estimated CrCl = Estimated Creatinine Clearance: 168.8 mL/min (A) (based on SCr of 0.47 mg/dL (L)).    Creatinine for last 3 days  2019:  6:49 AM Creatinine 0.51 mg/dL  2019:  6:17 AM Creatinine 0.47 mg/dL    Recent Vancomycin Levels (past 3 days)  2019:  3:33 PM Vancomycin Level 7.3 mg/L (10.5 hr trough)    Vancomycin IV Administrations (past 72 hours)                   vancomycin (VANCOCIN) 1,750 mg in sodium chloride 0.9 % 500 mL intermittent infusion (mg) 1,750 mg New Bag 19 0455     1,750 mg New Bag 19 1701                Nephrotoxins and other renal medications (From now, onward)    Start     Dose/Rate Route Frequency Ordered Stop    19 1106  ketorolac (TORADOL) injection 15 mg      15 mg Intravenous EVERY 6 HOURS PRN 19 1108 19 1105    19 1638  vancomycin (VANCOCIN) 1,750 mg in sodium chloride 0.9 % 500 mL intermittent infusion      1,750 mg  over 2 Hours Intravenous EVERY 12 HOURS 19 1638      19 1600  ampicillin-sulbactam (UNASYN) 3 g vial to attach to  mL bag      3 g  over 1 Hours Intravenous EVERY 6 HOURS 19 0936               Contrast Orders - past 72 hours (72h ago, onward)    Start     Dose/Rate Route Frequency Ordered Stop    19 1245  iopamidol (ISOVUE-370) solution 100 mL      100 mL Intravenous ONCE 19 1232 19 1249          Interpretation of levels and current regimen:  Trough level is  Subtherapeutic, however level drawn after only two doses so not at steady state. Since renal function is stable and vancomycin level not therapeutic yet it is safe to continue current dose.    Has serum creatinine changed > 50% in  last 72 hours: No    Urine output:  good urine output    Renal Function: Stable    Plan:  1.  Continue Current Dose  2.  Pharmacy will check trough levels as appropriate in 1-3 Days.    3. Serum creatinine levels will be ordered daily for the first week of therapy and at least twice weekly for subsequent weeks.      Melinda Walker        .

## 2019-07-20 NOTE — PLAN OF CARE
"  VS:    /74 (BP Location: Right arm)   Pulse 72   Temp 98  F (36.7  C) (Oral)   Resp 16   Ht 1.6 m (5' 3\")   Wt 83 kg (183 lb)   SpO2 98%   BMI 32.42 kg/m     Output:    Voiding spontaneously. Passing flatus.   Lungs: Clear   Activity:    Independent. Up in room.   Skin: Intact ex marked edema in left side. Swelling seems to have moved downwards and anteriorly into patients neck. Swollen area is indurated.    Pain:    Pt reported pain in L face/jaw radiating to medial sub-mandibular area. Pain partially controlled with PRN dilaudid, oxycodone, toradol, and ice packs.   Neuro/CMS:    CMS intact. A&O x 4. Denies N/T and nausea.   Dressing(s):    None   Diet:    Regular, tolerating well.   LDA:    L PIV infusing 125ml/hr continuous NS between antibiotics (unasyn and vancocin).   Equipment:    IV pole   Plan:    Continue to monitor. Pt able to make needs known. Pt to be reassess by oral surgery in morning about possible surgery today.   Additional Info:    Has penrose drain in L cheek.       "

## 2019-07-21 LAB
ANION GAP SERPL CALCULATED.3IONS-SCNC: 3 MMOL/L (ref 3–14)
BUN SERPL-MCNC: 8 MG/DL (ref 7–30)
CALCIUM SERPL-MCNC: 7.8 MG/DL (ref 8.5–10.1)
CHLORIDE SERPL-SCNC: 113 MMOL/L (ref 94–109)
CO2 SERPL-SCNC: 27 MMOL/L (ref 20–32)
CREAT SERPL-MCNC: 0.54 MG/DL (ref 0.52–1.04)
CRP SERPL-MCNC: 23.8 MG/L (ref 0–8)
ERYTHROCYTE [DISTWIDTH] IN BLOOD BY AUTOMATED COUNT: 13.3 % (ref 10–15)
GFR SERPL CREATININE-BSD FRML MDRD: >90 ML/MIN/{1.73_M2}
GLUCOSE BLDC GLUCOMTR-MCNC: 103 MG/DL (ref 70–99)
GLUCOSE BLDC GLUCOMTR-MCNC: 67 MG/DL (ref 70–99)
GLUCOSE BLDC GLUCOMTR-MCNC: 84 MG/DL (ref 70–99)
GLUCOSE BLDC GLUCOMTR-MCNC: 90 MG/DL (ref 70–99)
GLUCOSE SERPL-MCNC: 75 MG/DL (ref 70–99)
HCT VFR BLD AUTO: 29.7 % (ref 35–47)
HGB BLD-MCNC: 9.7 G/DL (ref 11.7–15.7)
MCH RBC QN AUTO: 28.2 PG (ref 26.5–33)
MCHC RBC AUTO-ENTMCNC: 32.7 G/DL (ref 31.5–36.5)
MCV RBC AUTO: 86 FL (ref 78–100)
PLATELET # BLD AUTO: 289 10E9/L (ref 150–450)
POTASSIUM SERPL-SCNC: 4.2 MMOL/L (ref 3.4–5.3)
RBC # BLD AUTO: 3.44 10E12/L (ref 3.8–5.2)
SODIUM SERPL-SCNC: 143 MMOL/L (ref 133–144)
WBC # BLD AUTO: 5.5 10E9/L (ref 4–11)

## 2019-07-21 PROCEDURE — 80048 BASIC METABOLIC PNL TOTAL CA: CPT | Performed by: INTERNAL MEDICINE

## 2019-07-21 PROCEDURE — 25000132 ZZH RX MED GY IP 250 OP 250 PS 637: Performed by: PHYSICIAN ASSISTANT

## 2019-07-21 PROCEDURE — 00000146 ZZHCL STATISTIC GLUCOSE BY METER IP

## 2019-07-21 PROCEDURE — 25000128 H RX IP 250 OP 636: Performed by: INTERNAL MEDICINE

## 2019-07-21 PROCEDURE — 86140 C-REACTIVE PROTEIN: CPT | Performed by: INTERNAL MEDICINE

## 2019-07-21 PROCEDURE — 85027 COMPLETE CBC AUTOMATED: CPT | Performed by: INTERNAL MEDICINE

## 2019-07-21 PROCEDURE — 12000001 ZZH R&B MED SURG/OB UMMC

## 2019-07-21 PROCEDURE — 25000128 H RX IP 250 OP 636: Performed by: HOSPITALIST

## 2019-07-21 PROCEDURE — 25800030 ZZH RX IP 258 OP 636: Performed by: INTERNAL MEDICINE

## 2019-07-21 PROCEDURE — 25000132 ZZH RX MED GY IP 250 OP 250 PS 637: Performed by: INTERNAL MEDICINE

## 2019-07-21 PROCEDURE — 99233 SBSQ HOSP IP/OBS HIGH 50: CPT | Performed by: INTERNAL MEDICINE

## 2019-07-21 PROCEDURE — 99207 ZZC CDG-MDM COMPONENT: MEETS MODERATE - UP CODED: CPT | Performed by: INTERNAL MEDICINE

## 2019-07-21 PROCEDURE — 36415 COLL VENOUS BLD VENIPUNCTURE: CPT | Performed by: INTERNAL MEDICINE

## 2019-07-21 RX ORDER — IBUPROFEN 200 MG
400 TABLET ORAL EVERY 6 HOURS PRN
Status: DISCONTINUED | OUTPATIENT
Start: 2019-07-21 | End: 2019-07-23 | Stop reason: HOSPADM

## 2019-07-21 RX ADMIN — POTASSIUM CHLORIDE, DEXTROSE MONOHYDRATE AND SODIUM CHLORIDE: 150; 5; 450 INJECTION, SOLUTION INTRAVENOUS at 01:43

## 2019-07-21 RX ADMIN — AMPICILLIN SODIUM AND SULBACTAM SODIUM 3 G: 2; 1 INJECTION, POWDER, FOR SOLUTION INTRAMUSCULAR; INTRAVENOUS at 11:51

## 2019-07-21 RX ADMIN — KETOROLAC TROMETHAMINE 15 MG: 15 INJECTION, SOLUTION INTRAMUSCULAR; INTRAVENOUS at 11:21

## 2019-07-21 RX ADMIN — ACETAMINOPHEN 975 MG: 325 TABLET, FILM COATED ORAL at 11:26

## 2019-07-21 RX ADMIN — HYDROMORPHONE HYDROCHLORIDE 4 MG: 2 TABLET ORAL at 11:18

## 2019-07-21 RX ADMIN — AMPICILLIN SODIUM AND SULBACTAM SODIUM 3 G: 2; 1 INJECTION, POWDER, FOR SOLUTION INTRAMUSCULAR; INTRAVENOUS at 00:26

## 2019-07-21 RX ADMIN — CHLORHEXIDINE GLUCONATE 0.12% ORAL RINSE 15 ML: 1.2 LIQUID ORAL at 19:38

## 2019-07-21 RX ADMIN — SENNOSIDES AND DOCUSATE SODIUM 2 TABLET: 8.6; 5 TABLET ORAL at 19:39

## 2019-07-21 RX ADMIN — HYDROMORPHONE HYDROCHLORIDE 4 MG: 2 TABLET ORAL at 03:16

## 2019-07-21 RX ADMIN — HYDROMORPHONE HYDROCHLORIDE 4 MG: 2 TABLET ORAL at 15:12

## 2019-07-21 RX ADMIN — ACETAMINOPHEN 975 MG: 325 TABLET, FILM COATED ORAL at 00:26

## 2019-07-21 RX ADMIN — Medication 250 MG: at 11:54

## 2019-07-21 RX ADMIN — AMPICILLIN SODIUM AND SULBACTAM SODIUM 3 G: 2; 1 INJECTION, POWDER, FOR SOLUTION INTRAMUSCULAR; INTRAVENOUS at 18:24

## 2019-07-21 RX ADMIN — HYDROMORPHONE HYDROCHLORIDE 4 MG: 2 TABLET ORAL at 23:14

## 2019-07-21 RX ADMIN — SENNOSIDES AND DOCUSATE SODIUM 2 TABLET: 8.6; 5 TABLET ORAL at 11:54

## 2019-07-21 RX ADMIN — AMPICILLIN SODIUM AND SULBACTAM SODIUM 3 G: 2; 1 INJECTION, POWDER, FOR SOLUTION INTRAMUSCULAR; INTRAVENOUS at 06:36

## 2019-07-21 RX ADMIN — KETOROLAC TROMETHAMINE 15 MG: 15 INJECTION, SOLUTION INTRAMUSCULAR; INTRAVENOUS at 17:14

## 2019-07-21 RX ADMIN — HYDROMORPHONE HYDROCHLORIDE 4 MG: 2 TABLET ORAL at 06:56

## 2019-07-21 RX ADMIN — ACETAMINOPHEN 975 MG: 325 TABLET, FILM COATED ORAL at 19:39

## 2019-07-21 RX ADMIN — PANTOPRAZOLE SODIUM 40 MG: 40 TABLET, DELAYED RELEASE ORAL at 11:54

## 2019-07-21 RX ADMIN — Medication 250 MG: at 19:38

## 2019-07-21 RX ADMIN — CHLORHEXIDINE GLUCONATE 0.12% ORAL RINSE 15 ML: 1.2 LIQUID ORAL at 11:54

## 2019-07-21 RX ADMIN — VANCOMYCIN HYDROCHLORIDE 1750 MG: 10 INJECTION, POWDER, LYOPHILIZED, FOR SOLUTION INTRAVENOUS at 04:18

## 2019-07-21 RX ADMIN — HYDROMORPHONE HYDROCHLORIDE 4 MG: 2 TABLET ORAL at 19:39

## 2019-07-21 ASSESSMENT — ACTIVITIES OF DAILY LIVING (ADL)
ADLS_ACUITY_SCORE: 11

## 2019-07-21 ASSESSMENT — PAIN DESCRIPTION - DESCRIPTORS
DESCRIPTORS: ACHING;SORE
DESCRIPTORS: ACHING;SORE
DESCRIPTORS: HEADACHE

## 2019-07-21 NOTE — PLAN OF CARE
VS: VSS. Denies CP/SOB   O2: >90% on RA   Output: Voiding w/o pain or difficulty   Last BM: 7/20/19 per pt report    Activity: Up independently, steady gait   Up for meals? No   Skin: L cheek swollen, no redness, cheek is marked.    Pain: Pain in L cheek and mouth, managing with scheduled tylenol. Warm packs. Taking dilaudid PO every 4 hours, need one dose of IV dilaudid and Toradol.   CMS: Intact   Dressing: None   Diet: Regular, was able to eat regular food not soft. Tolerated well.    LDA: PIV in L arm infusing.   Equipment: IV pole   Plan: TBD   Additional Info:

## 2019-07-21 NOTE — PROGRESS NOTES
"[facility] Internal Medicine Progress Note            Interval History:   Record reviewed.   Seen with RN.  Remains on IV Unsayn and vanco.   Further    Interval decrease left facial pain and  swelling.  Further improvement in  ability to open mandible.  Tolerating diet.  IV dilaudid and reduced dose IV toradol last PM - nothing over night.  Ambulatory without LH.   No CP, SOB, cough.  No nausea today. No reflux, abd pain.  Last BM 7/20.   Voiding OK.            Medications:   All medications reviewed today          Physical Exam:   Blood pressure 101/60, pulse 81, temperature 97  F (36.1  C), temperature source Oral, resp. rate 16, height 1.6 m (5' 3\"), weight 83 kg (183 lb), SpO2 97 %.  No intake or output data in the 24 hours ending 07/20/19 1044    General:  Alert.  Appropriate.  No distress.  No O2.     Heent:  Interval dramatic decrease left facial swelling - residual induration along mandible.  No fluctuance.     Neck:    Skin:    Chest:  clear    Cardiac:  Reg without gallop, murmur.  No JVD.     Abdomen:  Non distended, soft, non-tender.  BS normal.     Extremities:  Perfused.  No edema, calf, posterior thigh tenderness to suggest DVT.     Neuro:            Data:     Results for orders placed or performed during the hospital encounter of 07/18/19 (from the past 24 hour(s))   Vancomycin level   Result Value Ref Range    Vancomycin Level 7.3 mg/L   Basic metabolic panel   Result Value Ref Range    Sodium 143 133 - 144 mmol/L    Potassium 4.2 3.4 - 5.3 mmol/L    Chloride 113 (H) 94 - 109 mmol/L    Carbon Dioxide 27 20 - 32 mmol/L    Anion Gap 3 3 - 14 mmol/L    Glucose 75 70 - 99 mg/dL    Urea Nitrogen 8 7 - 30 mg/dL    Creatinine 0.54 0.52 - 1.04 mg/dL    GFR Estimate >90 >60 mL/min/[1.73_m2]    GFR Estimate If Black >90 >60 mL/min/[1.73_m2]    Calcium 7.8 (L) 8.5 - 10.1 mg/dL   CBC with platelets   Result Value Ref Range    WBC 5.5 4.0 - 11.0 10e9/L    RBC Count 3.44 (L) 3.8 - 5.2 10e12/L    " Hemoglobin 9.7 (L) 11.7 - 15.7 g/dL    Hematocrit 29.7 (L) 35.0 - 47.0 %    MCV 86 78 - 100 fl    MCH 28.2 26.5 - 33.0 pg    MCHC 32.7 31.5 - 36.5 g/dL    RDW 13.3 10.0 - 15.0 %    Platelet Count 289 150 - 450 10e9/L   CRP inflammation   Result Value Ref Range    CRP Inflammation 23.8 (H) 0.0 - 8.0 mg/L              Assessment and Plan:   1)  Dental abscess: In setting of recent root canal, worsening pain and swelling x4 days, failed OP antibiotics (augmentin). Presented to OSH ED, CT w/ moderate soft tissue swelling and inflammatory changes involving the apical aspect of the left hemimandible w/ periapical lucency involving tooth #19, immediately adjacent to that tooth, there is 2.5 x 13 mm fluid collection along the buccal soft tissues. S/P stab incision and debridement w/ purulent drainage, drain placed by OMFS (here). No culture data. Further interval improvement on IV Unasyn plus Vanco.  Extensive edema with question mandibular sclerosis concerning for osteomyelitis on CT(no comment in dental note).    2)  Hypoglycemia: BG  60-80s.  Attributed  to poor oral intake in setting of dental abscess. No symptoms.    Monitor off IV with Improved diet tolerance.  3)  Nausea likely med effect.  Possible opiate related (oxycodone). On ibuprofen PTA.  No ongoing acid peptic symptoms. Resolved.     PLAN:  1)  Review meds, orders.  2)  Attempt to wean IV dilaudid and toradol.  Add ibuprofen 400 mg QID prn with food (not to take with toradol).   Empiric protonix.  3)   Continue IV vanco and Unasyn.  Consider ID input as no culture data. 5)  Dental follow up regarding question of osteomyelitis and when OK to transition to po ABs. .  6)  Mobilize as tolerated.  7)  Trend labs.  Monitor clinically.   Disposition Plan   Expected discharge in 2-3 days to prior living arrangement once transitioned to po ABs. .     Entered: Channing Fernandez 07/21/2019, 9:13 AM              Attestation:  I have reviewed today's vital signs,  notes, medications, labs and imaging.     Channing Fernandez MD

## 2019-07-21 NOTE — CONSULTS
Dental Service Progress Note    Interval History   On 7/18/2019, patient was seen in the ED for an incision and drainage of the left mandible because of swelling and pain. Patient got admitted to the hospital after the procedure because the pain and swelling got worse. Visited the patient Friday 07/19/2019, Saturday 07/20/2019 for a follow up as well as Sunday 07/21/2019.    Exam     General : When I came in the room on 07/21/2019, patient sitting in bed comfortably. Patient states that she is feeling better and better every day. Patient states that pain is decreasing and attempting to wean IV dilaudid and toradol. Opening of the jaw, chewing and diet got better. Patient denies any difficulty breathing or pain when swallowing.    Extraoral exam : The swelling has decreased slightly with mild central induration. Inferior of mandible is now able to be palpated which is encouraging. Patient feels less tenderness and pain when palpated.     Intraoral exam : Patient's opening is stable from yesterday. (Approximately 2 fingers). Negative for FOM swelling and raised tongue. The drain is still in place.    Assessment  Patient's general habitus is getting better and better everyday. Patient is recovering and swelling is decreasing slowly but surely.    Plan  Continue OMFS recommendations. Monitor clinically and continue medication. (off of dilaudid). Expected discharge once patient is on PO AB's.            Damon Hope DMD  PGY-1  Pager: [phone]

## 2019-07-21 NOTE — PLAN OF CARE
Nursing   dental infection  S= Pt states swelling is getting better, less under mid line of chin, still L lat under and over jaw line w firm area.  Pinrose drain intact.  Denies noticing any oral drainage.   States pain gets increased after 3hrs, using po dialudid q4hr + toradal IV x1 with tolerable/managed discomfort + heat pakcs prn and lavender aromatherapy and essential oils w help.   Tolerating up to BR indenpdently well.  Encouraged walks inhall and ankle pumps freq- pt plans to be up when family visits this afternoon.    Pt declined am meds and brfst- wanted to keep sleeping in this am - states hadnt been sleeping well and finally sleeping.  AM BG noted75, pt taking po fluids, had juices/sugary drinks at bedside.   Later brfst BG 60's- reviewed w pt that having protien and other foods will keep BG in good range better than only sugary drinks- enc nutrtion- ate well then.  Also ate a late lunch- 1:30p with plan for dinner also.     A- decrease in face /jaw swelling  still sore but improving  nathalia to take po  R- cont q4hr po dialudid, avoid IV dialudid,  prn toradol juancarlos before meals.  Try po ibuprofen this maryam with food- dont use toradol and ibuprofen together.   Enc acitivty  enc nutrtion   monitor BG ac and hs today

## 2019-07-21 NOTE — PLAN OF CARE
"      VS:   /60 (BP Location: Right arm)   Pulse 81   Temp 97  F (36.1  C) (Oral)   Resp 16   Ht 1.6 m (5' 3\")   Wt 83 kg (183 lb)   SpO2 97%   BMI 32.42 kg/m      VSS. LS clear. Denies CP/SOB. O2 sats upper 90's on RA   Output:   Voiding + w/out difficulty. LBM 7/20 per pt report   Activity:   Independent / steady gait   Skin: Swelling on L cheek decreased from marked area. Pt reports feeling much better and that she is able to open her mouth much wider as well.    Pain:   Managed w/PO dilaudid 4mg q 4hrs and hot packs   Neuro/CMS:   A&Ox4. Cms+   Dressing(s):   Penrose drain in cheek, incision DAMEON- red/pink/swollen   Diet:   Mechanical soft diet, pt reports tolerance improved. Pt on D5 d/t low BG in the am 7/20   LDA:   PIV L forearm infusing IVMF   Equipment:      Plan:   Discharge to home when medically stable   Additional Info:   On unasyn q 6 and vanco q 12       "

## 2019-07-22 LAB
ALBUMIN SERPL-MCNC: 2.4 G/DL (ref 3.4–5)
ALP SERPL-CCNC: 41 U/L (ref 40–150)
ALT SERPL W P-5'-P-CCNC: 14 U/L (ref 0–50)
ANION GAP SERPL CALCULATED.3IONS-SCNC: 5 MMOL/L (ref 3–14)
AST SERPL W P-5'-P-CCNC: 12 U/L (ref 0–45)
BILIRUB SERPL-MCNC: 0.2 MG/DL (ref 0.2–1.3)
BUN SERPL-MCNC: 15 MG/DL (ref 7–30)
CALCIUM SERPL-MCNC: 8.1 MG/DL (ref 8.5–10.1)
CHLORIDE SERPL-SCNC: 109 MMOL/L (ref 94–109)
CO2 SERPL-SCNC: 28 MMOL/L (ref 20–32)
CREAT SERPL-MCNC: 0.56 MG/DL (ref 0.52–1.04)
CRP SERPL-MCNC: 15.7 MG/L (ref 0–8)
GFR SERPL CREATININE-BSD FRML MDRD: >90 ML/MIN/{1.73_M2}
GLUCOSE BLDC GLUCOMTR-MCNC: 111 MG/DL (ref 70–99)
GLUCOSE BLDC GLUCOMTR-MCNC: 99 MG/DL (ref 70–99)
GLUCOSE SERPL-MCNC: 79 MG/DL (ref 70–99)
POTASSIUM SERPL-SCNC: 3.5 MMOL/L (ref 3.4–5.3)
PROT SERPL-MCNC: 5.8 G/DL (ref 6.8–8.8)
SODIUM SERPL-SCNC: 142 MMOL/L (ref 133–144)

## 2019-07-22 PROCEDURE — 99207 ZZC CDG-MDM COMPONENT: MEETS MODERATE - UP CODED: CPT | Performed by: INTERNAL MEDICINE

## 2019-07-22 PROCEDURE — 00000146 ZZHCL STATISTIC GLUCOSE BY METER IP

## 2019-07-22 PROCEDURE — 99233 SBSQ HOSP IP/OBS HIGH 50: CPT | Performed by: INTERNAL MEDICINE

## 2019-07-22 PROCEDURE — 25000132 ZZH RX MED GY IP 250 OP 250 PS 637: Performed by: PHYSICIAN ASSISTANT

## 2019-07-22 PROCEDURE — 12000001 ZZH R&B MED SURG/OB UMMC

## 2019-07-22 PROCEDURE — 80053 COMPREHEN METABOLIC PANEL: CPT | Performed by: INTERNAL MEDICINE

## 2019-07-22 PROCEDURE — 25000128 H RX IP 250 OP 636: Performed by: HOSPITALIST

## 2019-07-22 PROCEDURE — 86140 C-REACTIVE PROTEIN: CPT | Performed by: INTERNAL MEDICINE

## 2019-07-22 PROCEDURE — 25000128 H RX IP 250 OP 636: Performed by: INTERNAL MEDICINE

## 2019-07-22 PROCEDURE — 25000132 ZZH RX MED GY IP 250 OP 250 PS 637: Performed by: INTERNAL MEDICINE

## 2019-07-22 PROCEDURE — 36415 COLL VENOUS BLD VENIPUNCTURE: CPT | Performed by: INTERNAL MEDICINE

## 2019-07-22 RX ORDER — AMOXICILLIN AND CLAVULANATE POTASSIUM 500; 125 MG/1; MG/1
1 TABLET, FILM COATED ORAL EVERY 8 HOURS SCHEDULED
Status: DISCONTINUED | OUTPATIENT
Start: 2019-07-22 | End: 2019-07-23 | Stop reason: HOSPADM

## 2019-07-22 RX ADMIN — Medication 250 MG: at 20:05

## 2019-07-22 RX ADMIN — HYDROMORPHONE HYDROCHLORIDE 4 MG: 2 TABLET ORAL at 02:22

## 2019-07-22 RX ADMIN — AMOXICILLIN AND CLAVULANATE POTASSIUM 1 TABLET: 500; 125 TABLET, FILM COATED ORAL at 14:16

## 2019-07-22 RX ADMIN — HYDROMORPHONE HYDROCHLORIDE 4 MG: 2 TABLET ORAL at 06:31

## 2019-07-22 RX ADMIN — ACETAMINOPHEN 975 MG: 325 TABLET, FILM COATED ORAL at 12:33

## 2019-07-22 RX ADMIN — SENNOSIDES AND DOCUSATE SODIUM 2 TABLET: 8.6; 5 TABLET ORAL at 08:19

## 2019-07-22 RX ADMIN — IBUPROFEN 400 MG: 200 TABLET, FILM COATED ORAL at 18:33

## 2019-07-22 RX ADMIN — HYDROMORPHONE HYDROCHLORIDE 4 MG: 2 TABLET ORAL at 22:25

## 2019-07-22 RX ADMIN — AMPICILLIN SODIUM AND SULBACTAM SODIUM 3 G: 2; 1 INJECTION, POWDER, FOR SOLUTION INTRAMUSCULAR; INTRAVENOUS at 00:00

## 2019-07-22 RX ADMIN — HYDROMORPHONE HYDROCHLORIDE 4 MG: 2 TABLET ORAL at 14:40

## 2019-07-22 RX ADMIN — CHLORHEXIDINE GLUCONATE 0.12% ORAL RINSE 15 ML: 1.2 LIQUID ORAL at 08:19

## 2019-07-22 RX ADMIN — HYDROMORPHONE HYDROCHLORIDE 4 MG: 2 TABLET ORAL at 18:33

## 2019-07-22 RX ADMIN — SENNOSIDES AND DOCUSATE SODIUM 2 TABLET: 8.6; 5 TABLET ORAL at 20:05

## 2019-07-22 RX ADMIN — KETOROLAC TROMETHAMINE 15 MG: 15 INJECTION, SOLUTION INTRAMUSCULAR; INTRAVENOUS at 12:34

## 2019-07-22 RX ADMIN — ACETAMINOPHEN 975 MG: 325 TABLET, FILM COATED ORAL at 05:28

## 2019-07-22 RX ADMIN — KETOROLAC TROMETHAMINE 15 MG: 15 INJECTION, SOLUTION INTRAMUSCULAR; INTRAVENOUS at 05:25

## 2019-07-22 RX ADMIN — HYDROMORPHONE HYDROCHLORIDE 4 MG: 2 TABLET ORAL at 10:37

## 2019-07-22 RX ADMIN — Medication 250 MG: at 08:18

## 2019-07-22 RX ADMIN — ACETAMINOPHEN 975 MG: 325 TABLET, FILM COATED ORAL at 20:04

## 2019-07-22 RX ADMIN — PANTOPRAZOLE SODIUM 40 MG: 40 TABLET, DELAYED RELEASE ORAL at 08:18

## 2019-07-22 RX ADMIN — AMOXICILLIN AND CLAVULANATE POTASSIUM 1 TABLET: 500; 125 TABLET, FILM COATED ORAL at 22:25

## 2019-07-22 RX ADMIN — CHLORHEXIDINE GLUCONATE 0.12% ORAL RINSE 15 ML: 1.2 LIQUID ORAL at 20:05

## 2019-07-22 RX ADMIN — AMPICILLIN SODIUM AND SULBACTAM SODIUM 3 G: 2; 1 INJECTION, POWDER, FOR SOLUTION INTRAMUSCULAR; INTRAVENOUS at 06:31

## 2019-07-22 ASSESSMENT — ACTIVITIES OF DAILY LIVING (ADL)
ADLS_ACUITY_SCORE: 11

## 2019-07-22 NOTE — PLAN OF CARE
L side face swelling decreased.On and off pain managed with oral dilaudid 4mg q3hrs and IV toradol,heat packs.Headache relieved with tylenol.Up ad arcenio to BR,voiding well.Passing gas.BM yesterday.States eating better.PIV saline locked in between IV abx.

## 2019-07-22 NOTE — CONSULTS
Dental Service Progress Note    Interval History   On 7/18/2019, patient was seen in the ED for an incision and drainage of the left mandible because of swelling and pain. Patient got admitted to the hospital after the procedure because the pain and swelling got worse. Visited the patient Friday 07/19/2019, Saturday 07/20/2019 for a follow up as well as Sunday 07/21/2019. Also Visited the patient Monday 07/22/2019.     Exam      General : When I came in the room on 07/21/2019, patient was standing and talking to the phone. Patient states that she is feeling a lot better. Patient states that pain is decreasing and the swelling as well. Opening of the jaw is stable but still limited. Patient denies any difficulty breathing or pain when swallowing.     Extraoral exam : The swelling has decreased slightly with mild central induration. Inferior of mandible is still able to be palpated which is encouraging. Patient feels less tenderness and pain when palpated.      Intraoral exam : Patient's opening is stable from yesterday. (Approximately 2 fingers). Negative for FOM swelling and raised tongue. The drain is still in place.     Assessment  Patient's general habitus is getting better and better everyday. Patient is recovering and swelling is decreasing slowly but surely.     Plan  Patient can be discharged and transitioned to PO AB's.  Augmentin 500mg TID x 7days.   Manage post-op swelling and pain with NSAIDs.  Gave the patient the Adult [facility] address and phone number because she needs to do a follow-up either at her local dentist or with us (recommended) to treat the tooth in question.           Damon Hope  PGY-1  Pager: [phone]

## 2019-07-22 NOTE — PROGRESS NOTES
"[facility] Internal Medicine Progress Note            Interval History:   Record reviewed.   Seen with RN.  Remains on IV Unsayn.  Vanco discontinued 7/21.  Further interval decrease left facial pain and swelling.  Off IV dilaudid.  Continued toradol.  No ibuprofen.   Tolerating diet.  Ambulatory without LH.   No CP, SOB, cough, nausea, reflux,.abd pain.  Last BM 7/20.   Voiding OK.            Medications:   All medications reviewed today          Physical Exam:   Blood pressure 109/64, pulse 66, temperature 98.5  F (36.9  C), temperature source Oral, resp. rate 16, height 1.6 m (5' 3\"), weight 83 kg (183 lb), SpO2 97 %.  No intake or output data in the 24 hours ending 07/20/19 1044    General:  Alert.  Appropriate.  No distress.  No O2.     Heent:  Interval decrease left facial swelling and area of induration.  No erythema.    No fluctuance.     Neck:    Skin:    Chest:  clear    Cardiac:  Reg without gallop, murmur.  No JVD.     Abdomen:  Non distended, soft, non-tender.  BS normal.     Extremities:  Perfused.  No edema, calf, posterior thigh tenderness to suggest DVT.     Neuro:            Data:     Results for orders placed or performed during the hospital encounter of 07/18/19 (from the past 24 hour(s))   Glucose by meter   Result Value Ref Range    Glucose 67 (L) 70 - 99 mg/dL   Glucose by meter   Result Value Ref Range    Glucose 84 70 - 99 mg/dL   Glucose by meter   Result Value Ref Range    Glucose 103 (H) 70 - 99 mg/dL   Glucose by meter   Result Value Ref Range    Glucose 90 70 - 99 mg/dL   Comprehensive metabolic panel   Result Value Ref Range    Sodium 142 133 - 144 mmol/L    Potassium 3.5 3.4 - 5.3 mmol/L    Chloride 109 94 - 109 mmol/L    Carbon Dioxide 28 20 - 32 mmol/L    Anion Gap 5 3 - 14 mmol/L    Glucose 79 70 - 99 mg/dL    Urea Nitrogen 15 7 - 30 mg/dL    Creatinine 0.56 0.52 - 1.04 mg/dL    GFR Estimate >90 >60 mL/min/[1.73_m2]    GFR Estimate If Black >90 >60 mL/min/[1.73_m2]    " Calcium 8.1 (L) 8.5 - 10.1 mg/dL    Bilirubin Total 0.2 0.2 - 1.3 mg/dL    Albumin 2.4 (L) 3.4 - 5.0 g/dL    Protein Total 5.8 (L) 6.8 - 8.8 g/dL    Alkaline Phosphatase 41 40 - 150 U/L    ALT 14 0 - 50 U/L    AST 12 0 - 45 U/L   CRP inflammation   Result Value Ref Range    CRP Inflammation 15.7 (H) 0.0 - 8.0 mg/L              Assessment and Plan:   1)  Dental abscess: Continued improvement in left facial pain, induration.  On Unasyn.  Off vanco.   2)  Hypoglycemia: BG  60-80s.  Attributed  to poor oral intake in setting of dental abscess. Adequate with resumed diet.     PLAN:  1)  Discontinue  IV dilaudid.  Patient plan to wean toradol and try ibuprofen 400 mg QID prn with food (not to take with toradol).   On empiric protonix.  3)   Continue IV Unasyn pending dental follow up. 4)   Trend labs.  Monitor clinically.   Disposition Plan   Expected discharge in 1-2 days to prior living arrangement once transitioned to po ABs. .     Entered: Channing Fernandez 07/22/2019, 8:00 AM     ADD:  OK per dental to transition to po Augmentin.          Attestation:  I have reviewed today's vital signs, notes, medications, labs and imaging.     Channing Fernandez MD

## 2019-07-22 NOTE — DISCHARGE SUMMARY
Admit Date:     07/18/2019   Discharge Date:  07//23/2019     DATE OF POSSIBLE DISCHARGE:  07/23/2019      FINAL DIAGNOSES:   1.  Dental abscess with marked facial swelling, erythema, induration, and tenderness subsequent to recent root canal.  Status post stab incision and debridement with purulent drainage by Oral Maxillofacial Surgery on presentation.  Dramatic improvement with IV antibiotics with transition to oral Augmentin 07/22/2019.   2.  Hypoglycemia with blood sugars in the 80s, attributed to poor oral intake in the setting of a dental abscess.  Adequate with resumed diet.      HISTORY OF PRESENT ILLNESS:  Pleasant 34-year-old female who underwent a root canal approximately 1 month ago.  Three weeks of relief.  Subsequent dental and left facial pain and swelling for 1 week, which had progressed in severity over 4 days prior to presentation.  Failed response to oral Augmentin.  Presentation to outside hospital Emergency Department.  CT scan demonstrated moderate soft tissue swelling and inflammatory change involving the apical aspect of the left hemimandible with periapical lucency involving tooth #19, immediately adjacent to which there was a 2.5 x 13 mm fluid collection along the buccal soft tissues.  Subsequently evaluated at this facility by Oral Maxillofacial Surgery who performed a stab incision with debridement with expression of purulent drainage.  Admission to the medical unit for IV antibiotics.  White blood count and lactic acid normal on 07/17 at outside hospital.  Vital signs demonstrated stable hemodynamics.  Blood cultures obtained at this facility returning negative.  No cultures obtained at time of dental abscess drainage.  Placement on IV Unasyn with admission to the medical service for continued IV antibiotics and pain control.      HOSPITAL COURSE:  The patient was admitted to the medical unit where she remained afebrile and hemodynamically stable.  Continued IV Unasyn.  Tylenol 975 mg  q.8h in addition to IV Dilaudid 0.2 mg IV q.2 hours p.r.n., oral Dilaudid 2-4 mg q.4 h. p.r.n., and Toradol 30 mg subsequently reduced to 15 mg IV q.6 hours p.r.n.  On 07/19/2019, patient was noted to have a significant increase in left facial pain, swelling, induration and erythema.  Prompted addition of IV vancomycin with followup imaging with CT soft tissue neck with contrast.  This demonstrated post-procedural changes of incision and drainage of the previously noted odontogenic abscess with drain placement in the left buccal soft tissues.  Extensive soft tissue edema along the left buccal mucosa and soft tissue of the left mandible extending to the anterior neck to the level of the thyroid, compatible with cellulitis.  Bilateral reactive cervical lymph nodes.  Odontogenic disease involving the 17th and 18th molars with surrounding sclerosis in the mandible, which per report was concerning for osteomyelitis.      The patient was continued on combined IV Unasyn and vancomycin.  As of 07/20, she noted significant reduction in degree of left facial pain and swelling.  Remained afebrile with stable hemodynamics.  Followed closely by  dental consultant who reviewed the case regularly with Oral Maxillofacial Surgery.  Initial thought of further surgical intervention was subsequently deferred with continued clinical monitoring.  The patient continued to do well on the combined above antibiotic regimen.  Progressive decrease in swelling, erythema, induration, and pain involving the left facial region to the point where patient had improved mobility of the mandible to the point where she could resume adequate oral intake.  Initially, she did have some issue with hypoglycemia with blood sugars dropping into the 60s, thought likely related to poor oral intake in the setting of the above dental abscess.  Nasal MRSA PCR returned negative.  Without MRSA colonization and impression that something such as MRSA or VRE was  unlikely, it was not felt that continued vancomycin was likely of benefit.  Discontinued on 07/21 with continued IV Unasyn alone.  As of 07/22, patient noted further dramatic decrease in left facial swelling, induration, and pain.  Resolution of erythema.  No fluctuance.  Blood sugar adequate in the  range with resumed diet.  Prior IV fluids containing dextrose discontinued.  BMP demonstrated normal electrolytes with BUN of 15, creatinine 0.56.  Despite IV vancomycin and Toradol, normal renal function.  CRP reduction from 23.8 on 07/21 to 15.7 on 7/22.  The patient seen in followup by a dental consultant on 7/22, with impression that patient could be transitioned back to oral Augmentin.  Augmentin 500 mg t.i.d initiated with discontinuation of IV Unasyn.  Monitored overnight to ensure that she continued to improve on the oral regimen.  The morning of  07/23 patient continued to do well. Tolerating augmentin. Further reduction in left facial swelling and induration.  Adequate pain control on oral dilaudid and ibuprofen.  Clinically stable for discharge.  Planned close dental follow up later this week with impression facial swelling should continue to improve after planned dental extraction left lower molar.    Augmentin 500 m g TID dispensed  for additional week.  Further dosing per dental based on follow up.  Dental follow up this week for molar extraction as planned.  Primary care appointment in the next week. Call MD if diarrhea or other symptoms of concern.  Work slip provided.             MEDICATIONS AT DISCHARGE:   1.  Extra Strength Tylenol 1000 mg q.6 hours p.r.n.   2.  Peridex 0.12% solution 15 mL swish and spit 2 times daily.   3.  Dilaudid 2-4 mg q.4 hours p.r.n. With plan to wean.  4.  Ibuprofen 400 mg q.6 hours p.r.n.     5.  Bowel meds as needed for constipation.   6.  Augmentin 500 mg q.8h. with 1-week supply, to be adjusted based on outpatient followup.       CONDITION AT DISCHARGE:  Stable.          ELISSA BOB MD             D: 2019   T: 2019   MT: WT      Name:     ADRIAN GONZALEZ   MRN:      [id number]        Account:        [id number]   :      1985           Admit Date:     2019                                  Discharge Date:       Document: [id number]       cc: [facility]

## 2019-07-22 NOTE — PLAN OF CARE
"      VS:   /70   Pulse 66   Temp 98.7  F (37.1  C)   Resp 16   Ht 1.6 m (5' 3\")   Wt 83 kg (183 lb)   SpO2 99%   BMI 32.42 kg/m       Output:   Voiding without difficulty,LBM 7/21   Lungs Clear   Activity:   Up ad arcenio independently in couch   Skin: Intact except for wound L cheek   Pain:   Pain managed with IV toradol, po dilaudid   Neuro/CMS:   intact   Dressing(s):   Penrose drain in cheek, incision DAMEON- red/pink/swollen   Diet:   Mechanical soft diet, pt reports tolerance improved   LDA:   piv   Equipment:   IV pump   Plan:   Discharge home when medically stable on po abx   Additional Info:   Feeling much better, less pain, edema less. Hopes to discharge tomorrow.       "

## 2019-07-23 ENCOUNTER — PATIENT OUTREACH (OUTPATIENT)
Dept: CARE COORDINATION | Facility: CLINIC | Age: 34
End: 2019-07-23

## 2019-07-23 ENCOUNTER — RECORDS - HEALTHEAST (OUTPATIENT)
Dept: ADMINISTRATIVE | Facility: OTHER | Age: 34
End: 2019-07-23

## 2019-07-23 VITALS
OXYGEN SATURATION: 100 % | WEIGHT: 183 LBS | HEIGHT: 63 IN | RESPIRATION RATE: 16 BRPM | TEMPERATURE: 97.5 F | DIASTOLIC BLOOD PRESSURE: 65 MMHG | HEART RATE: 59 BPM | SYSTOLIC BLOOD PRESSURE: 113 MMHG | BODY MASS INDEX: 32.43 KG/M2

## 2019-07-23 LAB
ANION GAP SERPL CALCULATED.3IONS-SCNC: 5 MMOL/L (ref 3–14)
BUN SERPL-MCNC: 23 MG/DL (ref 7–30)
CALCIUM SERPL-MCNC: 7.8 MG/DL (ref 8.5–10.1)
CHLORIDE SERPL-SCNC: 109 MMOL/L (ref 94–109)
CO2 SERPL-SCNC: 27 MMOL/L (ref 20–32)
CREAT SERPL-MCNC: 0.63 MG/DL (ref 0.52–1.04)
CRP SERPL-MCNC: 9.8 MG/L (ref 0–8)
GFR SERPL CREATININE-BSD FRML MDRD: >90 ML/MIN/{1.73_M2}
GLUCOSE BLDC GLUCOMTR-MCNC: 76 MG/DL (ref 70–99)
GLUCOSE SERPL-MCNC: 80 MG/DL (ref 70–99)
POTASSIUM SERPL-SCNC: 3.6 MMOL/L (ref 3.4–5.3)
SODIUM SERPL-SCNC: 141 MMOL/L (ref 133–144)

## 2019-07-23 PROCEDURE — 86140 C-REACTIVE PROTEIN: CPT | Performed by: INTERNAL MEDICINE

## 2019-07-23 PROCEDURE — 36415 COLL VENOUS BLD VENIPUNCTURE: CPT | Performed by: INTERNAL MEDICINE

## 2019-07-23 PROCEDURE — 25000132 ZZH RX MED GY IP 250 OP 250 PS 637: Performed by: INTERNAL MEDICINE

## 2019-07-23 PROCEDURE — 25000132 ZZH RX MED GY IP 250 OP 250 PS 637: Performed by: PHYSICIAN ASSISTANT

## 2019-07-23 PROCEDURE — 00000146 ZZHCL STATISTIC GLUCOSE BY METER IP

## 2019-07-23 PROCEDURE — 99207 ZZC CDG-CODE INCORRECT PER BILLING BASED ON TIME: CPT | Performed by: INTERNAL MEDICINE

## 2019-07-23 PROCEDURE — 99238 HOSP IP/OBS DSCHRG MGMT 30/<: CPT | Performed by: INTERNAL MEDICINE

## 2019-07-23 PROCEDURE — 80048 BASIC METABOLIC PNL TOTAL CA: CPT | Performed by: INTERNAL MEDICINE

## 2019-07-23 RX ORDER — AMOXICILLIN AND CLAVULANATE POTASSIUM 500; 125 MG/1; MG/1
1 TABLET, FILM COATED ORAL EVERY 8 HOURS
Qty: 21 TABLET | Refills: 0 | Status: SHIPPED | OUTPATIENT
Start: 2019-07-23 | End: 2019-07-30

## 2019-07-23 RX ORDER — SACCHAROMYCES BOULARDII 250 MG
250 CAPSULE ORAL 2 TIMES DAILY
Qty: 20 CAPSULE | Refills: 0 | Status: SHIPPED | OUTPATIENT
Start: 2019-07-23

## 2019-07-23 RX ORDER — ACETAMINOPHEN 325 MG/1
975 TABLET ORAL EVERY 8 HOURS PRN
COMMUNITY
Start: 2019-07-23 | End: 2019-08-11

## 2019-07-23 RX ORDER — HYDROMORPHONE HYDROCHLORIDE 2 MG/1
2-4 TABLET ORAL EVERY 4 HOURS PRN
Qty: 30 TABLET | Refills: 0 | Status: SHIPPED | OUTPATIENT
Start: 2019-07-23 | End: 2019-08-11

## 2019-07-23 RX ORDER — IBUPROFEN 400 MG/1
400 TABLET, FILM COATED ORAL EVERY 6 HOURS PRN
COMMUNITY
Start: 2019-07-23 | End: 2019-08-11

## 2019-07-23 RX ORDER — CHLORHEXIDINE GLUCONATE ORAL RINSE 1.2 MG/ML
15 SOLUTION DENTAL 2 TIMES DAILY
COMMUNITY
Start: 2019-07-23 | End: 2019-08-11

## 2019-07-23 RX ORDER — POLYETHYLENE GLYCOL 3350 17 G/17G
17 POWDER, FOR SOLUTION ORAL DAILY PRN
Qty: 14 PACKET | Refills: 0 | Status: SHIPPED | OUTPATIENT
Start: 2019-07-23 | End: 2019-08-11

## 2019-07-23 RX ORDER — AMOXICILLIN 250 MG
1-2 CAPSULE ORAL 2 TIMES DAILY
Qty: 30 TABLET | Refills: 0 | Status: SHIPPED | OUTPATIENT
Start: 2019-07-23

## 2019-07-23 RX ORDER — PANTOPRAZOLE SODIUM 40 MG/1
40 TABLET, DELAYED RELEASE ORAL
Qty: 30 TABLET | Refills: 0 | Status: SHIPPED | OUTPATIENT
Start: 2019-07-23

## 2019-07-23 RX ADMIN — CHLORHEXIDINE GLUCONATE 0.12% ORAL RINSE 15 ML: 1.2 LIQUID ORAL at 08:21

## 2019-07-23 RX ADMIN — HYDROMORPHONE HYDROCHLORIDE 4 MG: 2 TABLET ORAL at 02:54

## 2019-07-23 RX ADMIN — IBUPROFEN 400 MG: 200 TABLET, FILM COATED ORAL at 06:14

## 2019-07-23 RX ADMIN — Medication 250 MG: at 08:21

## 2019-07-23 RX ADMIN — HYDROMORPHONE HYDROCHLORIDE 4 MG: 2 TABLET ORAL at 08:28

## 2019-07-23 RX ADMIN — ACETAMINOPHEN 975 MG: 325 TABLET, FILM COATED ORAL at 08:21

## 2019-07-23 RX ADMIN — PANTOPRAZOLE SODIUM 40 MG: 40 TABLET, DELAYED RELEASE ORAL at 08:21

## 2019-07-23 RX ADMIN — AMOXICILLIN AND CLAVULANATE POTASSIUM 1 TABLET: 500; 125 TABLET, FILM COATED ORAL at 08:23

## 2019-07-23 RX ADMIN — SENNOSIDES AND DOCUSATE SODIUM 2 TABLET: 8.6; 5 TABLET ORAL at 08:21

## 2019-07-23 ASSESSMENT — ACTIVITIES OF DAILY LIVING (ADL)
ADLS_ACUITY_SCORE: 11

## 2019-07-23 NOTE — PROGRESS NOTES
July 23, 2019    To employer:    Zahida Garcia was hospitalized at [facility] on July 18th with discharge July 23rd due to medical illness.    Excused from work during that time.  Tentative work return on Friday July 26th pending dental follow up later this week.      Channing Fernandez MD    Internal Medicine Hospital Service

## 2019-07-23 NOTE — PLAN OF CARE
"      VS:   BP 94/53 (BP Location: Right arm)   Pulse 66   Temp 98.4  F (36.9  C) (Oral)   Resp 16   Ht 1.6 m (5' 3\")   Wt 83 kg (183 lb)   SpO2 97%   BMI 32.42 kg/m       Output:   Up to bathroom   Activity:   Independent    Skin: L cheek wound, swelling improved    Pain:   Pain controlled with PO dilaudid, ibuprofen and tylenol, ice applied to face    Neuro/CMS:   A&Ox4, CMS intact    Diet:   Dental soft diet, pt tolerating well   LDA:   PIV SL, penrose drain in place    Plan:   Discharge to home 7/23   Additional Info:   Pt is able to make needs known        "

## 2019-07-23 NOTE — PROGRESS NOTES
"[facility] Internal Medicine Progress Note            Interval History:   Record reviewed.  Seen this AM with RN. Transitioned to po augmentin 7/21.  Tolerating without GI upset.  Continued improvement.   Further interval decrease left facial pain and swelling.  Off IV pain meds.  Controlled with ibuprofen and dilaudid 4 mg.   Tolerating diet.  Ambulatory without LH.   No CP, SOB, cough, nausea, reflux,.abd pain.  Last BM 7/21.   Voiding OK.            Medications:   All medications reviewed today          Physical Exam:   Blood pressure 113/65, pulse 59, temperature 97.5  F (36.4  C), temperature source Oral, resp. rate 16, height 1.6 m (5' 3\"), weight 83 kg (183 lb), SpO2 100 %.  No intake or output data in the 24 hours ending 07/20/19 1044    General:  Alert.  Appropriate.  No distress.  No O2.     Heent:  Interval dramatic decrease left facial swelling and area of induration.  (Per dental should continue to improve after extraction left lower molar). No erythema.    No fluctuance.     Neck:    Skin:    Chest:  clear    Cardiac:  Reg without gallop, murmur.  No JVD.     Abdomen:  Non distended, soft, non-tender.  BS normal.     Extremities:  Perfused.  No edema, calf, posterior thigh tenderness to suggest DVT.     Neuro:            Data:     Results for orders placed or performed during the hospital encounter of 07/18/19 (from the past 24 hour(s))   Glucose by meter   Result Value Ref Range    Glucose 99 70 - 99 mg/dL   Glucose by meter   Result Value Ref Range    Glucose 111 (H) 70 - 99 mg/dL   Basic metabolic panel   Result Value Ref Range    Sodium 141 133 - 144 mmol/L    Potassium 3.6 3.4 - 5.3 mmol/L    Chloride 109 94 - 109 mmol/L    Carbon Dioxide 27 20 - 32 mmol/L    Anion Gap 5 3 - 14 mmol/L    Glucose 80 70 - 99 mg/dL    Urea Nitrogen 23 7 - 30 mg/dL    Creatinine 0.63 0.52 - 1.04 mg/dL    GFR Estimate >90 >60 mL/min/[1.73_m2]    GFR Estimate If Black >90 >60 mL/min/[1.73_m2]    Calcium 7.8 (L) " 8.5 - 10.1 mg/dL   CRP inflammation   Result Value Ref Range    CRP Inflammation 9.8 (H) 0.0 - 8.0 mg/L   Glucose by meter   Result Value Ref Range    Glucose 76 70 - 99 mg/dL              Assessment and Plan:   1)  Dental abscess: Doing well with transition to oral ABs.    2)  Hypoglycemia: BG  60-80s.  Attributed  to poor oral intake in setting of dental abscess. Resolved with resumed diet.     PLAN:  1)  Clinically stable for discharge.  2)  Meds, orders reviewed.  3)  Dilaudid 2-4 mg with instructions to wean.  Augmentin 500 m g TID for additional week.  Further dosing per dental based on follow up.  4)  Dental follow up this week for molar extraction as planned Dental to determine length of antibiotic treatment Primary care appointment in the next week. Call MD if diarrhea or other symptoms of concern.  Work slip provided.     Disposition Plan   Expected discharge today.      Entered: Channing Fernandez 07/23/2019, 11:59 AM              Attestation:  I have reviewed today's vital signs, notes, medications, labs and imaging.     Channing Fernandez MD           No

## 2019-07-23 NOTE — PLAN OF CARE
A/O x 4. Pain is tolerable with PO dilaudid and ice packs. Left side of face swelling is improved, penrose drain is intact.  Independent to the bathroom and voiding well. VSS. Call light is in reach cont to assess.

## 2019-07-23 NOTE — PLAN OF CARE
Pt. discharged at 1000 to home and left with personal belongings. Pt. received complete discharge paperwork and medications as filled by discharge pharmacy. Pt. was given times of last dose for all discharge medications in writing on discharge medication sheets. Discharge teaching included medication administration, and pain management. Pt. to follow up with dental in a week. Pt. had no further questions at the time of discharge and no unmet needs were identified.

## 2019-07-24 LAB
BACTERIA SPEC CULT: NO GROWTH
BACTERIA SPEC CULT: NO GROWTH
Lab: NORMAL
Lab: NORMAL
SPECIMEN SOURCE: NORMAL
SPECIMEN SOURCE: NORMAL

## 2019-07-24 NOTE — PROGRESS NOTES
[facility]: Post-Discharge Note  SITUATION                                                      Admission:    Admission Date: 07/18/19   Reason for Admission: Dental abscess with marked facial swelling  Discharge:   Discharge Date: 07/23/19  Discharge Diagnosis: Dental abscess with marked facial swelling  Discharge Service: Hospitalist    BACKGROUND                                                      Pleasant 34-year-old female who underwent a root canal approximately 1 month ago.  Three weeks of relief.  Subsequent dental and left facial pain and swelling for 1 week, which had progressed in severity over 4 days prior to presentation.  Failed response to oral Augmentin.  Presentation to outside hospital Emergency Department.  CT scan demonstrated moderate soft tissue swelling and inflammatory change involving the apical aspect of the left hemimandible with periapical lucency involving tooth #19, immediately adjacent to which there was a 2.5 x 13 mm fluid collection along the buccal soft tissues.  Subsequently evaluated at this facility by Oral Maxillofacial Surgery who performed a stab incision with debridement with expression of purulent drainage.  Admission to the medical unit for IV antibiotics.  White blood count and lactic acid normal on 07/17 at outside hospital.  Vital signs demonstrated stable hemodynamics.  Blood cultures obtained at this facility returning negative.  No cultures obtained at time of dental abscess drainage.  Placement on IV Unasyn with admission to the medical service for continued IV antibiotics and pain control.     ASSESSMENT      Discharge Assessment  Patient reports symptoms are: Improved  Does the patient have all of their medications?: Yes  Does patient know what their new medications are for?: Yes  Does patient have a follow-up appointment scheduled?: No  Does patient have any other questions or concerns?: No    Post-op  Did the patient have surgery or a  procedure: No  Fever: No  Chills: No  Eating & Drinking: eating and drinking without complaints/concerns  PO Intake: regular diet  Bowel Function: normal  Urinary Status: voiding without complaint/concerns    PLAN                                                      Outpatient Plan:      Dental follow up this week for molar extraction as planned.    Primary care appointment in the next week.   Call MD if diarrhea or other symptoms of concern.    Work slip provided.     No future appointments.        Helga Owens, CMA

## 2019-07-29 ENCOUNTER — RECORDS - HEALTHEAST (OUTPATIENT)
Dept: ADMINISTRATIVE | Facility: OTHER | Age: 34
End: 2019-07-29

## 2019-07-29 ENCOUNTER — HOSPITAL ENCOUNTER (EMERGENCY)
Facility: CLINIC | Age: 34
Discharge: HOME | End: 2019-07-29
Attending: FAMILY MEDICINE | Admitting: FAMILY MEDICINE
Payer: COMMERCIAL

## 2019-07-29 VITALS
HEART RATE: 58 BPM | TEMPERATURE: 98.3 F | RESPIRATION RATE: 16 BRPM | BODY MASS INDEX: 31.22 KG/M2 | SYSTOLIC BLOOD PRESSURE: 122 MMHG | WEIGHT: 176.25 LBS | OXYGEN SATURATION: 97 % | DIASTOLIC BLOOD PRESSURE: 70 MMHG

## 2019-07-29 DIAGNOSIS — G89.18 POSTOPERATIVE PAIN: ICD-10-CM

## 2019-07-29 DIAGNOSIS — K08.409 HISTORY OF TOOTH EXTRACTION, UNSPECIFIED EDENTULISM CLASS: ICD-10-CM

## 2019-07-29 PROCEDURE — 25000132 ZZH RX MED GY IP 250 OP 250 PS 637: Performed by: FAMILY MEDICINE

## 2019-07-29 PROCEDURE — 99283 EMERGENCY DEPT VISIT LOW MDM: CPT

## 2019-07-29 PROCEDURE — 99283 EMERGENCY DEPT VISIT LOW MDM: CPT | Mod: Z6 | Performed by: FAMILY MEDICINE

## 2019-07-29 RX ORDER — OXYCODONE AND ACETAMINOPHEN 5; 325 MG/1; MG/1
2 TABLET ORAL ONCE
Status: COMPLETED | OUTPATIENT
Start: 2019-07-29 | End: 2019-07-29

## 2019-07-29 RX ORDER — OXYCODONE AND ACETAMINOPHEN 5; 325 MG/1; MG/1
1 TABLET ORAL ONCE
Status: DISCONTINUED | OUTPATIENT
Start: 2019-07-29 | End: 2019-07-29

## 2019-07-29 RX ORDER — IBUPROFEN 800 MG/1
800 TABLET, FILM COATED ORAL EVERY 8 HOURS PRN
Qty: 15 TABLET | Refills: 0 | Status: SHIPPED | OUTPATIENT
Start: 2019-07-29 | End: 2019-08-03

## 2019-07-29 RX ORDER — OXYCODONE AND ACETAMINOPHEN 5; 325 MG/1; MG/1
1-2 TABLET ORAL EVERY 6 HOURS PRN
Qty: 10 TABLET | Refills: 0 | Status: SHIPPED | OUTPATIENT
Start: 2019-07-29 | End: 2019-08-11

## 2019-07-29 RX ADMIN — OXYCODONE HYDROCHLORIDE AND ACETAMINOPHEN 2 TABLET: 5; 325 TABLET ORAL at 11:29

## 2019-07-29 ASSESSMENT — ENCOUNTER SYMPTOMS
FEVER: 0
FACIAL SWELLING: 1

## 2019-07-29 NOTE — ED AVS SNAPSHOT
[facility], [place], Emergency Department  [address]  [facility] [address]  Phone:  [phone]  Fax:  [phone]                                    Zahida Garcia   MRN: [id number]    Department:  [facility], Emergency Department   Date of Visit:  7/29/2019           After Visit Summary Signature Page    I have received my discharge instructions, and my questions have been answered. I have discussed any challenges I see with this plan with the nurse or doctor.    ..........................................................................................................................................  Patient/Patient Representative Signature      ..........................................................................................................................................  Patient Representative Print Name and Relationship to Patient    ..................................................               ................................................  Date                                   Time    ..........................................................................................................................................  Reviewed by Signature/Title    ...................................................              ..............................................  Date                                               Time          22EPIC Rev 08/18

## 2019-07-29 NOTE — DISCHARGE INSTRUCTIONS
Discharge to home finish your Augmentin as prescribed use ibuprofen 3 times a day as directed and may use Percocet if needed for additional pain.  Plan on follow-up with the dentist tomorrow.

## 2019-07-29 NOTE — ED PROVIDER NOTES
[facility] [place] ([facility])    7/29/19       History     Chief Complaint   Patient presents with     Dental Pain     Pt just sent down from dental clinic after tooth extraction and penrose drain removal.  States the dental clinic sent her here for pain control and antibiotic.  States they would not Rx any meds in dental clinic.     The history is provided by the patient and medical records.     Zahida Garcia is a 34 year old female who was recently hospitalized here from 7/18/2019 to 7/23/2019 for a dental abscess with marked facial swelling, erythema, induration and tenderness subsequent to recent root canal.  Patient underwent a stab incision and debridement with purulent drainage by oral maxillofacial surgery.  Patient had improvement with IV antibiotics and was transitioned to oral Augmentin on 7/22/2019.    The patient was being seen in the dental clinic today where she had the Penrose drain removed and according to the patient had the tooth extracted as well.  The patient states that she was told by the dental clinic that they were not allowed to give any pain medications in clinic and she needed to come here to the Emergency Department if she was having pain.  Patient was given 2 tablets of ibuprofen in the clinic prior to arrival.  Patient here is complaining of pain throughout the left side of her face.  The patient does report that her left-sided facial swelling has significantly improved since her hospitalization.  The patient was discharged from the hospital with oral Dilaudid; she reports that she took her last dose yesterday at 7 PM.  The patient is still on her Augmentin and she reports that she has 4 more doses.    I have reviewed the Medications, Allergies, Past Medical and Surgical History, and Social History in the [employer] system.    Past Medical History:   Diagnosis Date     Dental abscess 07/2019       Past Surgical History:   Procedure Laterality Date      APPENDECTOMY       CHOLECYSTECTOMY         No family history on file.    Social History     Tobacco Use     Smoking status: Current Every Day Smoker     Packs/day: 0.25     Smokeless tobacco: Never Used   Substance Use Topics     Alcohol use: Yes     Comment: rarely       No current facility-administered medications for this encounter.      Current Outpatient Medications   Medication     acetaminophen (TYLENOL) 325 MG tablet     amoxicillin-clavulanate (AUGMENTIN) 500-125 MG tablet     chlorhexidine (PERIDEX) 0.12 % solution     HYDROmorphone (DILAUDID) 2 MG tablet     ibuprofen (ADVIL/MOTRIN) 400 MG tablet     ibuprofen (ADVIL/MOTRIN) 800 MG tablet     oxyCODONE-acetaminophen (PERCOCET) 5-325 MG tablet     pantoprazole (PROTONIX) 40 MG EC tablet     polyethylene glycol (MIRALAX/GLYCOLAX) packet     saccharomyces boulardii (FLORASTOR) 250 MG capsule     senna-docusate (SENOKOT-S/PERICOLACE) 8.6-50 MG tablet        Allergies   Allergen Reactions     Tramadol Other (See Comments)     Seizures per patient         Review of Systems   Constitutional: Negative for fever.   HENT: Positive for dental problem (left sided) and facial swelling (left sided; improving).         Positive for left-sided facial pain   All other systems reviewed and are negative.      Physical Exam   BP: (!) 127/104  Pulse: 101  Temp: 98.3  F (36.8  C)  Resp: 20  Weight: 79.9 kg (176 lb 4 oz)  SpO2: 100 %      Physical Exam   Constitutional: No distress.   HENT:   Patient with continued left sided mandibular swelling status post extraction of tooth earlier today with tenderness no mucopurulent drainage is noted Penrose is removed.   Eyes: Pupils are equal, round, and reactive to light. No scleral icterus.   Cardiovascular: Normal heart sounds and intact distal pulses.   Pulmonary/Chest: Breath sounds normal. No respiratory distress.   Abdominal: Soft. Bowel sounds are normal. There is no tenderness.   Musculoskeletal: She exhibits no edema or  tenderness.   Skin: Skin is warm. No rash noted. She is not diaphoretic.       ED Course   11:21 AM  The patient was seen and examined by Raulito Langley MD in Room ED07.     Procedures             Critical Care time:  none          Assessments & Plan (with Medical Decision Making)       I have reviewed the nursing notes.    I have reviewed the findings, diagnosis, plan and need for follow up with the patient.  Patient with history of recent dental abscess requiring inpatient IV antibiotics now with extraction of tooth this morning at the dental clinic I had a discussion with the dentist who stated yes in fact he did do a dental extraction and removed the Penrose drain and felt as though the infection had improved remarkably with the plan to continue with current Augmentin.  They did not prescribe any pain medications but agreed with our plan to prescribe a short course of Percocet.  Patient had received Percocet here in the emergency room is remarkably improved in her discomfort.  She will be discharged with 10 tablets of Percocet 15 tablets of ibuprofen continue with her Augmentin and follow-up with a dentist by phone tomorrow.      Final diagnoses:   History of tooth extraction, unspecified edentulism class     I, Vinny Cr, am serving as a trained medical scribe to document services personally performed by Raulito Langley MD, based on the provider's statements to me.   I, Raulito Langley MD, was physically present and have reviewed and verified the accuracy of this note documented by Vinny Cr.    7/29/2019   [facility], EMERGENCY DEPARTMENT     Raulito Langley MD  07/29/19 6019

## 2019-07-30 ENCOUNTER — HOSPITAL ENCOUNTER (EMERGENCY)
Facility: CLINIC | Age: 34
Discharge: HOME | End: 2019-07-30
Attending: FAMILY MEDICINE | Admitting: FAMILY MEDICINE
Payer: COMMERCIAL

## 2019-07-30 ENCOUNTER — APPOINTMENT (OUTPATIENT)
Dept: CT IMAGING | Facility: CLINIC | Age: 34
End: 2019-07-30
Attending: FAMILY MEDICINE
Payer: COMMERCIAL

## 2019-07-30 ENCOUNTER — RECORDS - HEALTHEAST (OUTPATIENT)
Dept: ADMINISTRATIVE | Facility: OTHER | Age: 34
End: 2019-07-30

## 2019-07-30 VITALS
OXYGEN SATURATION: 99 % | TEMPERATURE: 98.2 F | WEIGHT: 176.3 LBS | RESPIRATION RATE: 16 BRPM | SYSTOLIC BLOOD PRESSURE: 117 MMHG | DIASTOLIC BLOOD PRESSURE: 82 MMHG | HEART RATE: 75 BPM | BODY MASS INDEX: 31.23 KG/M2

## 2019-07-30 DIAGNOSIS — K08.89 PAIN, DENTAL: ICD-10-CM

## 2019-07-30 LAB
ANION GAP SERPL CALCULATED.3IONS-SCNC: 8 MMOL/L (ref 3–14)
BASOPHILS # BLD AUTO: 0 10E9/L (ref 0–0.2)
BASOPHILS NFR BLD AUTO: 0.2 %
BUN SERPL-MCNC: 16 MG/DL (ref 7–30)
CALCIUM SERPL-MCNC: 9 MG/DL (ref 8.5–10.1)
CHLORIDE SERPL-SCNC: 105 MMOL/L (ref 94–109)
CO2 SERPL-SCNC: 27 MMOL/L (ref 20–32)
CREAT SERPL-MCNC: 0.48 MG/DL (ref 0.52–1.04)
DIFFERENTIAL METHOD BLD: ABNORMAL
EOSINOPHIL # BLD AUTO: 0 10E9/L (ref 0–0.7)
EOSINOPHIL NFR BLD AUTO: 0.7 %
ERYTHROCYTE [DISTWIDTH] IN BLOOD BY AUTOMATED COUNT: 13.2 % (ref 10–15)
GFR SERPL CREATININE-BSD FRML MDRD: >90 ML/MIN/{1.73_M2}
GLUCOSE SERPL-MCNC: 88 MG/DL (ref 70–99)
HCT VFR BLD AUTO: 38.7 % (ref 35–47)
HGB BLD-MCNC: 12.9 G/DL (ref 11.7–15.7)
IMM GRANULOCYTES # BLD: 0 10E9/L (ref 0–0.4)
IMM GRANULOCYTES NFR BLD: 0.2 %
LYMPHOCYTES # BLD AUTO: 2.3 10E9/L (ref 0.8–5.3)
LYMPHOCYTES NFR BLD AUTO: 38.1 %
MCH RBC QN AUTO: 28.9 PG (ref 26.5–33)
MCHC RBC AUTO-ENTMCNC: 33.3 G/DL (ref 31.5–36.5)
MCV RBC AUTO: 87 FL (ref 78–100)
MONOCYTES # BLD AUTO: 0.4 10E9/L (ref 0–1.3)
MONOCYTES NFR BLD AUTO: 6.4 %
NEUTROPHILS # BLD AUTO: 3.3 10E9/L (ref 1.6–8.3)
NEUTROPHILS NFR BLD AUTO: 54.4 %
NRBC # BLD AUTO: 0 10*3/UL
NRBC BLD AUTO-RTO: 0 /100
PLATELET # BLD AUTO: 511 10E9/L (ref 150–450)
POTASSIUM SERPL-SCNC: 3.6 MMOL/L (ref 3.4–5.3)
RBC # BLD AUTO: 4.47 10E12/L (ref 3.8–5.2)
SODIUM SERPL-SCNC: 140 MMOL/L (ref 133–144)
WBC # BLD AUTO: 6.1 10E9/L (ref 4–11)

## 2019-07-30 PROCEDURE — 80048 BASIC METABOLIC PNL TOTAL CA: CPT | Performed by: FAMILY MEDICINE

## 2019-07-30 PROCEDURE — 99285 EMERGENCY DEPT VISIT HI MDM: CPT | Mod: Z6 | Performed by: FAMILY MEDICINE

## 2019-07-30 PROCEDURE — 85025 COMPLETE CBC W/AUTO DIFF WBC: CPT | Performed by: FAMILY MEDICINE

## 2019-07-30 PROCEDURE — 96374 THER/PROPH/DIAG INJ IV PUSH: CPT | Performed by: FAMILY MEDICINE

## 2019-07-30 PROCEDURE — 99285 EMERGENCY DEPT VISIT HI MDM: CPT | Mod: 25 | Performed by: FAMILY MEDICINE

## 2019-07-30 PROCEDURE — 25000128 H RX IP 250 OP 636: Performed by: FAMILY MEDICINE

## 2019-07-30 PROCEDURE — 96361 HYDRATE IV INFUSION ADD-ON: CPT | Performed by: FAMILY MEDICINE

## 2019-07-30 PROCEDURE — 70486 CT MAXILLOFACIAL W/O DYE: CPT

## 2019-07-30 RX ORDER — OXYCODONE AND ACETAMINOPHEN 5; 325 MG/1; MG/1
1-2 TABLET ORAL EVERY 4 HOURS PRN
Qty: 4 TABLET | Refills: 0 | Status: SHIPPED | OUTPATIENT
Start: 2019-07-30 | End: 2019-08-11

## 2019-07-30 RX ORDER — HYDROMORPHONE HYDROCHLORIDE 1 MG/ML
0.5 INJECTION, SOLUTION INTRAMUSCULAR; INTRAVENOUS; SUBCUTANEOUS ONCE
Status: COMPLETED | OUTPATIENT
Start: 2019-07-30 | End: 2019-07-30

## 2019-07-30 RX ORDER — IBUPROFEN 800 MG/1
800 TABLET, FILM COATED ORAL EVERY 8 HOURS PRN
Qty: 15 TABLET | Refills: 0 | Status: SHIPPED | OUTPATIENT
Start: 2019-07-30 | End: 2019-08-07

## 2019-07-30 RX ADMIN — SODIUM CHLORIDE 1000 ML: 9 INJECTION, SOLUTION INTRAVENOUS at 18:47

## 2019-07-30 RX ADMIN — HYDROMORPHONE HYDROCHLORIDE 0.5 MG: 1 INJECTION, SOLUTION INTRAMUSCULAR; INTRAVENOUS; SUBCUTANEOUS at 18:49

## 2019-07-30 NOTE — ED AVS SNAPSHOT
[facility], [place], Emergency Department  [address]  [facility] [address]  Phone:  [phone]  Fax:  [phone]                                    Zahida Garcia   MRN: [id number]    Department:  [facility], Emergency Department   Date of Visit:  7/30/2019           After Visit Summary Signature Page    I have received my discharge instructions, and my questions have been answered. I have discussed any challenges I see with this plan with the nurse or doctor.    ..........................................................................................................................................  Patient/Patient Representative Signature      ..........................................................................................................................................  Patient Representative Print Name and Relationship to Patient    ..................................................               ................................................  Date                                   Time    ..........................................................................................................................................  Reviewed by Signature/Title    ...................................................              ..............................................  Date                                               Time          22EPIC Rev 08/18

## 2019-07-30 NOTE — ED NOTES
"Pt is tearful due to the 10/10 pain from the \"stabbing\" pain from the lower left area where she had a tooth extracted yesterday.  She took her last Percocet at 1045 which helped to decrease the pain. She tells me that it brings the pain down to a 7/10.  "

## 2019-07-31 ASSESSMENT — ENCOUNTER SYMPTOMS
SHORTNESS OF BREATH: 0
ABDOMINAL PAIN: 0
FACIAL SWELLING: 1
FEVER: 0

## 2019-07-31 NOTE — CONSULTS
Dental Service Progress Note    Interval History   On 7/18/19, patient was seen in the ED for an incision and drainage of the left mandible because of swelling and pain. Patient was admitted to the hospital after the procedure because of worsening signs and symptoms. Patient was discharged on 7/23/19 from the hospital. Patient was seen in dental clinic on 7/29/19 for simple extraction of left mandibular molar. Patient returned to ED 7/30/19 complaining of increased pain and swelling in lower left.       Exam   Extraoral exam: mild buccal space swelling on left side with mild induration located buccal to left mandibular second premolar. No limited opening. Able to palpate inferior border of the mandible. Pt reports increased pain during palpation of left buccinator and border of the mandible.     Intraoral exam: Extraction site on lower left appears to healing normally.    Assessment   Normal healing following extraction of tooth associated with acute apical abscess.      Plan  Manage post-op swelling and pain with NSAIDs and continue to take antibiotic prescription as prescribed to completion.           Laith Martines PGY-1  Pager: [phone]  Pager: [phone]

## 2019-07-31 NOTE — ED PROVIDER NOTES
History     Chief Complaint   Patient presents with     Dental Pain     Had lower left tooth extraction. Having intolerable pain.      HPI  Zahida Garcia is a 34 year old female who was seen here yesterday in the emergency room saw her after she had a tooth extraction.  Please note patient had previous dental infection requiring inpatient hospitalization with IV antibiotics she was then prescribed Augmentin and pain medications including Dilaudid orally.  Patient was seen yesterday after she had her tooth extracted and follow-up with the dental clinic.  She was having excruciating pain and she was given a short course of Percocet with plans for her to follow-up with the dental clinic today patient however was unable to make her dental appointment secondary to the severity of the pain.  She notes that she has had shooting pains and deep aching in the jaw consistent with when she had her previous abscess.  Patient has had slight increase in swelling since yesterday and is exquisitely tender.    I have reviewed the Medications, Allergies, Past Medical and Surgical History, and Social History in the Epic system.    PERSONAL MEDICAL HISTORY  Past Medical History:   Diagnosis Date     Dental abscess 2019     Gastroesophageal reflux disease      PAST SURGICAL HISTORY  Past Surgical History:   Procedure Laterality Date     APPENDECTOMY       CHOLECYSTECTOMY       FAMILY HISTORY  History reviewed. No pertinent family history.  SOCIAL HISTORY  Social History     Tobacco Use     Smoking status: Former Smoker     Packs/day: 0.25     Years: 5.00     Pack years: 1.25     Last attempt to quit: 2019     Years since quittin.1     Smokeless tobacco: Never Used   Substance Use Topics     Alcohol use: Not Currently     Comment: rarely     MEDICATIONS  No current facility-administered medications for this encounter.      Current Outpatient Medications   Medication     chlorhexidine (PERIDEX) 0.12 % solution      ibuprofen (ADVIL/MOTRIN) 800 MG tablet     ibuprofen (ADVIL/MOTRIN) 800 MG tablet     oxyCODONE-acetaminophen (PERCOCET) 5-325 MG tablet     oxyCODONE-acetaminophen (PERCOCET) 5-325 MG tablet     pantoprazole (PROTONIX) 40 MG EC tablet     polyethylene glycol (MIRALAX/GLYCOLAX) packet     saccharomyces boulardii (FLORASTOR) 250 MG capsule     senna-docusate (SENOKOT-S/PERICOLACE) 8.6-50 MG tablet     acetaminophen (TYLENOL) 325 MG tablet     HYDROmorphone (DILAUDID) 2 MG tablet     ibuprofen (ADVIL/MOTRIN) 400 MG tablet     ALLERGIES  Allergies   Allergen Reactions     Tramadol Other (See Comments)     Seizures per patient         Review of Systems   Constitutional: Negative for fever.   HENT: Positive for dental problem and facial swelling.    Respiratory: Negative for shortness of breath.    Cardiovascular: Negative for chest pain.   Gastrointestinal: Negative for abdominal pain.   All other systems reviewed and are negative.      Physical Exam   BP: (!) 141/103  Pulse: 74  Temp: 97.8  F (36.6  C)  Resp: 18  Weight: 80 kg (176 lb 4.8 oz)  SpO2: 99 %      Physical Exam   Constitutional: No distress.   HENT:   Head: Atraumatic.   Mouth/Throat: Oropharynx is clear and moist. No oropharyngeal exudate.   Patient has extreme pain and tenderness over the left lower jaw with slight firm swelling and possible abscess.   Eyes: Pupils are equal, round, and reactive to light. No scleral icterus.   Cardiovascular: Normal heart sounds and intact distal pulses.   Pulmonary/Chest: Breath sounds normal. No respiratory distress.   Abdominal: Soft. Bowel sounds are normal. There is no tenderness.   Musculoskeletal: She exhibits no edema or tenderness.   Skin: Skin is warm. No rash noted. She is not diaphoretic.       ED Course        Procedures         Critical Care time:  none        Results for orders placed or performed during the hospital encounter of 07/30/19   CT Facial Bones without Contrast    Narrative    CT OF THE FACE  WITHOUT CONTRAST 7/30/2019 7:29 PM     COMPARISON: ______    HISTORY: Facial swelling.    TECHNIQUE:  Helical thin-section axial CT images of the face were  acquired without contrast. Coronal reconstructions were created from  the axial source data.    FINDINGS: The first molar in the left aspect of the mandible has been  extracted. There are no mandibular erosions at this site. No  significant dental erosions are identified. There is soft tissue  swelling along the body of the mandible on the left side near the site  of tooth extraction. No focal fluid collection is identified to  suggest abscess.    There are small shotty submandibular lymph nodes on the left side  which are most likely reactive. Temporomandibular joints are normally  positioned. The maxilla is unremarkable. The globes and orbits are  unremarkable. There is a tiny air-fluid level in the right maxillary  sinus. There is mild mucosal thickening in the ethmoid air cells.      Impression    IMPRESSION: Soft tissue swelling along the body of the mandible at the  site of the first molar extraction on the left side. No mandible or  soft tissue abscess is identified.      Radiation dose for this scan was reduced using automated exposure  control, adjustment of the mA and/or kV according to patient size, or  iterative reconstruction technique    THANG CARDONA MD   CBC with platelets differential   Result Value Ref Range    WBC 6.1 4.0 - 11.0 10e9/L    RBC Count 4.47 3.8 - 5.2 10e12/L    Hemoglobin 12.9 11.7 - 15.7 g/dL    Hematocrit 38.7 35.0 - 47.0 %    MCV 87 78 - 100 fl    MCH 28.9 26.5 - 33.0 pg    MCHC 33.3 31.5 - 36.5 g/dL    RDW 13.2 10.0 - 15.0 %    Platelet Count 511 (H) 150 - 450 10e9/L    Diff Method Automated Method     % Neutrophils 54.4 %    % Lymphocytes 38.1 %    % Monocytes 6.4 %    % Eosinophils 0.7 %    % Basophils 0.2 %    % Immature Granulocytes 0.2 %    Nucleated RBCs 0 0 /100    Absolute Neutrophil 3.3 1.6 - 8.3 10e9/L    Absolute  Lymphocytes 2.3 0.8 - 5.3 10e9/L    Absolute Monocytes 0.4 0.0 - 1.3 10e9/L    Absolute Eosinophils 0.0 0.0 - 0.7 10e9/L    Absolute Basophils 0.0 0.0 - 0.2 10e9/L    Abs Immature Granulocytes 0.0 0 - 0.4 10e9/L    Absolute Nucleated RBC 0.0    Basic metabolic panel   Result Value Ref Range    Sodium 140 133 - 144 mmol/L    Potassium 3.6 3.4 - 5.3 mmol/L    Chloride 105 94 - 109 mmol/L    Carbon Dioxide 27 20 - 32 mmol/L    Anion Gap 8 3 - 14 mmol/L    Glucose 88 70 - 99 mg/dL    Urea Nitrogen 16 7 - 30 mg/dL    Creatinine 0.48 (L) 0.52 - 1.04 mg/dL    GFR Estimate >90 >60 mL/min/[1.73_m2]    GFR Estimate If Black >90 >60 mL/min/[1.73_m2]    Calcium 9.0 8.5 - 10.1 mg/dL     Patient with history of recent significant dental abscess infection recently having Penrose drain removed and tooth extracted she now presents with exquisite increase in pain I did have the dentist see her here in the emergency room who was concerned about a possible return of abscess we therefore did imaging which included a CT of the mandible no abscess was identified.         Assessments & Plan (with Medical Decision Making)       I have reviewed the nursing notes.    I have reviewed the findings, diagnosis, plan and need for follow up with the patient.  Patient with resolving dental abscess and tooth extraction at this time is quite concerned about the possibility of withdrawing off of the Dilaudid that she had previously been prescribed in spite of her Percocet dosage she now states that she feels as though she may be withdrawing.  I have discussed the fact that she cannot receive chronic narcotics from the emergency room I prescribed 4 tablets of Percocet in order for her to be seen by her primary doctor who can help manage some of her symptoms.  Patient will be discharged home and follow-up with her primary clinic tomorrow.           Medication List      Modified    * ibuprofen 400 MG tablet  Commonly known as:  ADVIL/MOTRIN  What changed:   Another medication with the same name was added. Make sure you understand how and when to take each.     * ibuprofen 800 MG tablet  Commonly known as:  ADVIL/MOTRIN  800 mg, Oral, EVERY 8 HOURS PRN  What changed:  Another medication with the same name was added. Make sure you understand how and when to take each.     * ibuprofen 800 MG tablet  Commonly known as:  ADVIL/MOTRIN  800 mg, Oral, EVERY 8 HOURS PRN  What changed:  You were already taking a medication with the same name, and this prescription was added. Make sure you understand how and when to take each.     * oxyCODONE-acetaminophen 5-325 MG tablet  Commonly known as:  PERCOCET  1-2 tablets, Oral, EVERY 6 HOURS PRN  What changed:  Another medication with the same name was added. Make sure you understand how and when to take each.     * oxyCODONE-acetaminophen 5-325 MG tablet  Commonly known as:  PERCOCET  1-2 tablets, Oral, EVERY 4 HOURS PRN  What changed:  You were already taking a medication with the same name, and this prescription was added. Make sure you understand how and when to take each.         * This list has 5 medication(s) that are the same as other medications prescribed for you. Read the directions carefully, and ask your doctor or other care provider to review them with you.            ASK your doctor about these medications    amoxicillin-clavulanate 500-125 MG tablet  Commonly known as:  AUGMENTIN  1 tablet, Oral, EVERY 8 HOURS  Ask about: Should I take this medication?            Final diagnoses:   Pain, dental       7/30/2019   [facility], [place], EMERGENCY DEPARTMENT     Raulito Langley MD  07/31/19 4035

## 2019-07-31 NOTE — DISCHARGE INSTRUCTIONS
Patient discharged home with advice to use anti-inflammatories was given 4 tablets of Percocet until she can be seen by her primary MD she understands that there will be no further narcotic prescriptions for this episode from the emergency room.

## 2019-08-11 ENCOUNTER — RECORDS - HEALTHEAST (OUTPATIENT)
Dept: ADMINISTRATIVE | Facility: OTHER | Age: 34
End: 2019-08-11

## 2019-08-11 ENCOUNTER — HOSPITAL ENCOUNTER (EMERGENCY)
Facility: CLINIC | Age: 34
Discharge: HOME | End: 2019-08-11
Attending: EMERGENCY MEDICINE | Admitting: EMERGENCY MEDICINE
Payer: COMMERCIAL

## 2019-08-11 VITALS
OXYGEN SATURATION: 99 % | SYSTOLIC BLOOD PRESSURE: 148 MMHG | HEART RATE: 87 BPM | TEMPERATURE: 98.7 F | DIASTOLIC BLOOD PRESSURE: 96 MMHG | RESPIRATION RATE: 18 BRPM

## 2019-08-11 DIAGNOSIS — F41.9 ANXIETY: ICD-10-CM

## 2019-08-11 PROCEDURE — 25000132 ZZH RX MED GY IP 250 OP 250 PS 637: Performed by: EMERGENCY MEDICINE

## 2019-08-11 PROCEDURE — 99283 EMERGENCY DEPT VISIT LOW MDM: CPT

## 2019-08-11 RX ORDER — HYDROXYZINE HYDROCHLORIDE 25 MG/1
50 TABLET, FILM COATED ORAL ONCE
Status: COMPLETED | OUTPATIENT
Start: 2019-08-11 | End: 2019-08-11

## 2019-08-11 RX ADMIN — HYDROXYZINE HYDROCHLORIDE 50 MG: 25 TABLET ORAL at 04:23

## 2019-08-11 ASSESSMENT — ENCOUNTER SYMPTOMS: NERVOUS/ANXIOUS: 1

## 2019-08-11 NOTE — ED AVS SNAPSHOT
[facility] Emergency Department  [address]  [facility] [address]  Phone:  [phone]  Fax:  [phone]                                    Zahida Garcia   MRN: [id number]    Department:  [facility] Emergency Department   Date of Visit:  8/11/2019           After Visit Summary Signature Page    I have received my discharge instructions, and my questions have been answered. I have discussed any challenges I see with this plan with the nurse or doctor.    ..........................................................................................................................................  Patient/Patient Representative Signature      ..........................................................................................................................................  Patient Representative Print Name and Relationship to Patient    ..................................................               ................................................  Date                                   Time    ..........................................................................................................................................  Reviewed by Signature/Title    ...................................................              ..............................................  Date                                               Time          22EPIC Rev 08/18

## 2019-08-11 NOTE — ED PROVIDER NOTES
History     Chief Complaint:  Anxiety    HPI   Zahida Garcia is a 34 year old female who presents to the ED for evaluation of anxiety. The patient says she has been struggling with panic attacks for about 5 years now. The patient notes, however, that she has been very stressed lately after being in the hospital for about 8-10 days due to a dental infection. She says that being in the hospital put her behind on a lot of things and made her very stressed. She also says she is currently out of hydroxyzine 50 mg. She says that she has been using hydroxyzine every day. Of note, the patient says she does not have thoughts of hurting herself.     Allergies:  Tramadol: Seizures    Medications:    Stool softener  Protonix  Suboxone    Past Medical History:    Dental abscess  GERD    Past Surgical History:    Appendectomy  Cholecystectomy    Family History:    History reviewed. No pertinent family history.     Social History:  Smoking status: Former Smoker-0.25 ppd  Alcohol use: Not currently-rarely  Marital Status:          Review of Systems   Psychiatric/Behavioral: Negative for suicidal ideas. The patient is nervous/anxious.    All other systems reviewed and are negative.        Physical Exam   First Vitals:  Patient Vitals for the past 24 hrs:   BP Temp src Pulse Heart Rate Resp SpO2   08/11/19 0346 148/96 Oral 87 87 18 99 %     Physical Exam    Nursing note and vitals reviewed.  Constitutional: Cooperative.   Cardiovascular: Normal rate, regular rhythm and normal heart sounds.  No murmur.  Pulmonary/Chest: Effort normal and breath sounds normal. No respiratory distress. No wheezes.  Abdominal: Normal appearance  Neurological: Alert.   Skin: Skin is warm and dry.   Psychiatric:  Anxious appearing with no suicidal ideation      Emergency Department Course     Interventions:  Hydroxyzine 50mg PO    Emergency Department Course:  Past medical records, nursing notes, and vitals reviewed.  0400: I performed an  exam of the patient and obtained history, as documented above.  Findings and plan explained to the Patient. Patient discharged home with instructions regarding supportive care, medications, and reasons to return. The importance of close follow-up was reviewed.     Impression & Plan      Medical Decision Making:  Ms. Angeles is a 34 year old female with anxiety who presents with their panic attack symptoms. She uses hydroxyzine which she is out of. She has an appointment with her physician today. No suicidal ideation or signs of psychosis. She simply wishes to receive a dose of hydroxyzine here which is reasonable. She will be discharged home with primary care follow up.     Diagnosis:    ICD-10-CM    1. Anxiety F41.9        Disposition:  discharged to home    Poli Briggs  8/11/2019   [facility] EMERGENCY DEPARTMENT  Scribe Disclosure:  I, Poli Briggs, am serving as a scribe at 4:00 AM on 8/11/2019 to document services personally performed by Matt Shultz MD based on my observations and the provider's statements to me.        Matt Shultz MD  08/11/19 0619

## 2019-08-11 NOTE — ED TRIAGE NOTES
Anxiety started 15 minutes ago with sob and arm numbness. Is taking hydroxyzine for anxiety but is out of medication. Has appointment to see MD on Monday to have prescription refill. ABCs intact.

## 2019-09-22 ENCOUNTER — HOSPITAL ENCOUNTER (EMERGENCY)
Facility: CLINIC | Age: 34
Discharge: HOME | End: 2019-09-22
Attending: EMERGENCY MEDICINE | Admitting: EMERGENCY MEDICINE

## 2019-09-22 ENCOUNTER — RECORDS - HEALTHEAST (OUTPATIENT)
Dept: ADMINISTRATIVE | Facility: OTHER | Age: 34
End: 2019-09-22

## 2019-09-22 VITALS
WEIGHT: 180 LBS | BODY MASS INDEX: 31.89 KG/M2 | SYSTOLIC BLOOD PRESSURE: 120 MMHG | HEART RATE: 76 BPM | RESPIRATION RATE: 16 BRPM | DIASTOLIC BLOOD PRESSURE: 73 MMHG | TEMPERATURE: 97.8 F | OXYGEN SATURATION: 97 %

## 2019-09-22 DIAGNOSIS — N39.0 URINARY TRACT INFECTION WITH HEMATURIA, SITE UNSPECIFIED: ICD-10-CM

## 2019-09-22 DIAGNOSIS — R31.9 URINARY TRACT INFECTION WITH HEMATURIA, SITE UNSPECIFIED: ICD-10-CM

## 2019-09-22 LAB
ALBUMIN UR-MCNC: 100 MG/DL
APPEARANCE UR: ABNORMAL
BACTERIA #/AREA URNS HPF: ABNORMAL /HPF
BILIRUB UR QL STRIP: NEGATIVE
COLOR UR AUTO: YELLOW
GLUCOSE UR STRIP-MCNC: NEGATIVE MG/DL
HCG UR QL: NEGATIVE
HGB UR QL STRIP: ABNORMAL
KETONES UR STRIP-MCNC: NEGATIVE MG/DL
LEUKOCYTE ESTERASE UR QL STRIP: ABNORMAL
MUCOUS THREADS #/AREA URNS LPF: PRESENT /LPF
NITRATE UR QL: NEGATIVE
PH UR STRIP: 5.5 PH (ref 5–7)
RBC #/AREA URNS AUTO: >182 /HPF (ref 0–2)
SOURCE: ABNORMAL
SP GR UR STRIP: 1.02 (ref 1–1.03)
SQUAMOUS #/AREA URNS AUTO: 6 /HPF (ref 0–1)
UROBILINOGEN UR STRIP-MCNC: NORMAL MG/DL (ref 0–2)
WBC #/AREA URNS AUTO: >182 /HPF (ref 0–5)
WBC CLUMPS #/AREA URNS HPF: PRESENT /HPF

## 2019-09-22 PROCEDURE — 25000132 ZZH RX MED GY IP 250 OP 250 PS 637: Performed by: EMERGENCY MEDICINE

## 2019-09-22 PROCEDURE — 87086 URINE CULTURE/COLONY COUNT: CPT | Performed by: EMERGENCY MEDICINE

## 2019-09-22 PROCEDURE — 81001 URINALYSIS AUTO W/SCOPE: CPT | Performed by: EMERGENCY MEDICINE

## 2019-09-22 PROCEDURE — 87186 SC STD MICRODIL/AGAR DIL: CPT | Performed by: EMERGENCY MEDICINE

## 2019-09-22 PROCEDURE — 99283 EMERGENCY DEPT VISIT LOW MDM: CPT

## 2019-09-22 PROCEDURE — 81025 URINE PREGNANCY TEST: CPT | Performed by: EMERGENCY MEDICINE

## 2019-09-22 PROCEDURE — 87088 URINE BACTERIA CULTURE: CPT | Performed by: EMERGENCY MEDICINE

## 2019-09-22 RX ORDER — PHENAZOPYRIDINE HYDROCHLORIDE 100 MG/1
200 TABLET, FILM COATED ORAL ONCE
Status: COMPLETED | OUTPATIENT
Start: 2019-09-22 | End: 2019-09-22

## 2019-09-22 RX ORDER — CEPHALEXIN 500 MG/1
500 CAPSULE ORAL 2 TIMES DAILY
Qty: 14 CAPSULE | Refills: 0 | Status: SHIPPED | OUTPATIENT
Start: 2019-09-22 | End: 2019-09-29

## 2019-09-22 RX ORDER — PHENAZOPYRIDINE HYDROCHLORIDE 200 MG/1
200 TABLET, FILM COATED ORAL 3 TIMES DAILY
Qty: 9 TABLET | Refills: 0 | Status: SHIPPED | OUTPATIENT
Start: 2019-09-22 | End: 2019-09-25

## 2019-09-22 RX ADMIN — PHENAZOPYRIDINE HYDROCHLORIDE 200 MG: 100 TABLET, FILM COATED ORAL at 15:09

## 2019-09-22 ASSESSMENT — ENCOUNTER SYMPTOMS
NAUSEA: 0
ABDOMINAL PAIN: 1
FLANK PAIN: 0
DYSURIA: 1
FEVER: 1
BACK PAIN: 0

## 2019-09-22 NOTE — ED TRIAGE NOTES
Patient comes in for evaluation of UTI symptoms. Has severe burning, urgency, and fevers. States she was trying to wait to go to clinic but the symptoms became to unbearable. ABCs intact.

## 2019-09-22 NOTE — ED PROVIDER NOTES
History     Chief Complaint:  Rule out Urinary Tract Infection    HPI   Zahida Garcia is a 34 year old female with a history of recurrent UTIs presenting with urinary complaint.  Patient reports for the past few days having dysuria as well as urinary hesitancy.  She reports mild suprapubic abdominal pain and subjective fevers though denies any nausea, vomiting, back pain, vaginal discharge or other symptoms.  She states this feels similar to previous UTIs.  Review of records shows hospitalization for pyelonephritis 4/2019.  No history of kidney stones.  She has not taken anything for pain relief.    Allergies:  Tramadol    Medications:    Lexapro  Suboxone    Past Medical History:    Anxiety  Depression  Gastro-oesophageal reflux disease  Obesity  Chronic back pain  Overdose  Pylonephritis  Sepsis  Urinary tract infection    Past Surgical History:    Appendectomy  Cholecystectomy  Eye muscle surgery    Family History:    Diabetes    Social History:  Smoking status: Former, quit 5/30/19  Alcohol use: Not currently, rare  Drug use: No  PCP: [facility]  Marital Status:   [2]    Review of Systems   Constitutional: Positive for fever (subjective).   Gastrointestinal: Positive for abdominal pain. Negative for nausea.   Genitourinary: Positive for dysuria and urgency. Negative for flank pain, vaginal bleeding and vaginal discharge.   Musculoskeletal: Negative for back pain.   All other systems reviewed and are negative.      Physical Exam     Patient Vitals for the past 24 hrs:   BP Temp Temp src Pulse Resp SpO2 Weight   09/22/19 1503 115/61 97.8  F (36.6  C) Temporal 76 16 98 % 81.6 kg (180 lb)     Physical Exam  Nursing note and vitals reviewed.  Constitutional: Well nourished. Resting comfortably.   Eyes: Conjunctiva normal.  Pupils are equal, round, and reactive to light.   ENT: Nose normal. Mucous membranes pink and moist.    Neck: Normal range of motion.  CVS: Normal rate, regular  rhythm.  Normal heart sounds.  No murmur.  Pulmonary: Lungs clear to auscultation bilaterally. No wheezes/rales/rhonchi.  GI: Abdomen soft. Mild suprapubic tenderness. No rigidity or guarding.  No CVA tenderness  MSK: No calf tenderness or swelling.  Neuro: Alert. Follows simple commands.  Skin: Skin is warm and dry. No rash noted.   Psychiatric: Normal affect.     Emergency Department Course   Laboratory:  HCG Qualitative: Negative  UA: Moderate blood, Protein albumin 100, Large leukocyte esterase, WBC/HPF <182 (H), RBC/HPF >182 (H), WBC clumps present, Moderate bacteria, Squamous epithelial/HPF 6 (H), Mucous present o/w WNL    Urine culture: pending    Interventions:  1509: Pyridium 200 mg PO    Emergency Department Course:  Past medical records, nursing notes, and vitals reviewed.  1502: I performed an exam of the patient and obtained history, as documented above.  The patient provided a urine sample here in the emergency department. This was sent for laboratory testing, findings above.    1527: I rechecked the patient. Findings and plan explained to the Patient. Patient discharged home with instructions regarding supportive care, medications, and reasons to return. The importance of close follow-up was reviewed.     Impression & Plan      Medical Decision Making:  Zahida Garcia is a 34 year old female has symptoms consistent with a urinary tract infection.  Urinalysis confirms the infection.  There has been no significant fever, significant back/flank pain or significant abdominal pain.  There is no clinical evidence of pyelonephritis, appendicitis,  or diverticulitis.  I offered patient formal blood work particularly given her history of pyelonephritis though she is deferring at this point in time.  The patient will be started on antibiotics for the infection. She will be initiated on pyridium for symptom management, counseled that medication will cause red-orange urine discoloration.  The patient is  instructed to return if increasing pain, vomiting, fever, or inability to tolerate the oral antibiotic.  Follow up with primary physician is indicated if not improving in 2-3 days.     Diagnosis:    ICD-10-CM    1. Urinary tract infection with hematuria, site unspecified N39.0 Urine Culture Aerobic Bacterial    R31.9        Disposition:  discharged to home    Discharge Medications:  New Prescriptions    CEPHALEXIN (KEFLEX) 500 MG CAPSULE    Take 1 capsule (500 mg) by mouth 2 times daily for 7 days    PHENAZOPYRIDINE (PYRIDIUM) 200 MG TABLET    Take 1 tablet (200 mg) by mouth 3 times daily for 3 days     Mario Doshi  9/22/2019   [facility] EMERGENCY DEPARTMENT       Tasha Luna DO  09/22/19 [id number]

## 2019-09-22 NOTE — ED AVS SNAPSHOT
[facility] Emergency Department  [address]  [facility] [address]  Phone:  [phone]  Fax:  [phone]                                    Zahida Garcia   MRN: [id number]    Department:  [facility] Emergency Department   Date of Visit:  9/22/2019           After Visit Summary Signature Page    I have received my discharge instructions, and my questions have been answered. I have discussed any challenges I see with this plan with the nurse or doctor.    ..........................................................................................................................................  Patient/Patient Representative Signature      ..........................................................................................................................................  Patient Representative Print Name and Relationship to Patient    ..................................................               ................................................  Date                                   Time    ..........................................................................................................................................  Reviewed by Signature/Title    ...................................................              ..............................................  Date                                               Time          22EPIC Rev 08/18

## 2019-09-23 NOTE — RESULT ENCOUNTER NOTE
Emergency Dept/Urgent Care discharge antibiotic (if prescribed): Cephalexin (Keflex) 500 mg capsule, 1 capsule (500 mg) by mouth 2 times daily for 7 days.  Date of Rx (if applicable):  9/22/19  No changes in treatment per Urine culture protocol.

## 2019-09-24 ENCOUNTER — TELEPHONE (OUTPATIENT)
Dept: EMERGENCY MEDICINE | Facility: CLINIC | Age: 34
End: 2019-09-24

## 2019-09-24 LAB
BACTERIA SPEC CULT: ABNORMAL
SPECIMEN SOURCE: ABNORMAL

## 2019-09-24 NOTE — RESULT ENCOUNTER NOTE
Final Urine Culture Report on 9/24/19  Emergency Dept/Urgent Care discharge antibiotic prescribed: Cephalexin (Keflex) 500 mg capsule, 1 capsule (500 mg) by mouth 2 times daily for 7 days.  #1. Bacteria, 50,000 to 100,000 colonies/ml Escherichia coli, is SUSCEPTIBLE to Antibiotic.    As per [place] ED Lab Result protocol, no change in antibiotic therapy.

## 2019-09-24 NOTE — TELEPHONE ENCOUNTER
"[facility] Emergency Department/Urgent Care Lab result notification [Positive for uti and bacteria is susceptible to antibiotic]:    Reason for call:   Notify of Final urine culture result, confirm patient is taking antibiotic, assess symptoms, and advise per Emergency Dept/Urgent Care discharge instructions and Emergency Dept urine culture protocol.    Lab Result & Date of Final Report [copied from Result Note]:    Final Urine Culture Report on 9/24/19  Emergency Dept/Urgent Care discharge antibiotic prescribed: Cephalexin (Keflex) 500 mg capsule, 1 capsule (500 mg) by mouth 2 times daily for 7 days.  #1. Bacteria, 50,000 to 100,000 colonies/ml Escherichia coli, is SUSCEPTIBLE to Antibiotic.    As per [place] ED Lab Result protocol, no change in antibiotic therapy.    Current symptoms (include time patient called):    Zahida feeling \"a lot better\", taking abx as prescribed.  No concerns.    Recommendations/Instructions:   Take antibiotic as directed by the Emergency Dept/Urgent Care Provider.    UTI prevention/education reviewed with patient [Yes/No]:  No    Please contact you PCP or return to the Emergency Department if your:    Symptoms return    Symptomas do not improved after completing antibiotic.    Symptoms worsen or other concerning symptoms      Karla Estrella RN    [employer] [place]   Lung Nodule and ED Lab Results F/U RN  Epic pool (ED late result f/u RN) : P [id number]  Ph # [phone]    "

## 2021-05-29 ENCOUNTER — HEALTH MAINTENANCE LETTER (OUTPATIENT)
Age: 36
End: 2021-05-29

## 2021-09-18 ENCOUNTER — HEALTH MAINTENANCE LETTER (OUTPATIENT)
Age: 36
End: 2021-09-18

## 2022-06-25 ENCOUNTER — HEALTH MAINTENANCE LETTER (OUTPATIENT)
Age: 37
End: 2022-06-25

## 2022-11-20 ENCOUNTER — HEALTH MAINTENANCE LETTER (OUTPATIENT)
Age: 37
End: 2022-11-20

## 2023-07-08 ENCOUNTER — HEALTH MAINTENANCE LETTER (OUTPATIENT)
Age: 38
End: 2023-07-08

## 2024-02-03 ENCOUNTER — HEALTH MAINTENANCE LETTER (OUTPATIENT)
Age: 39
End: 2024-02-03